# Patient Record
Sex: MALE | Race: WHITE | Employment: OTHER | URBAN - METROPOLITAN AREA
[De-identification: names, ages, dates, MRNs, and addresses within clinical notes are randomized per-mention and may not be internally consistent; named-entity substitution may affect disease eponyms.]

---

## 2019-02-11 ENCOUNTER — OFFICE VISIT (OUTPATIENT)
Dept: SURGERY | Age: 59
End: 2019-02-11
Payer: COMMERCIAL

## 2019-02-11 VITALS
WEIGHT: 266 LBS | RESPIRATION RATE: 16 BRPM | BODY MASS INDEX: 41.75 KG/M2 | DIASTOLIC BLOOD PRESSURE: 78 MMHG | HEART RATE: 78 BPM | SYSTOLIC BLOOD PRESSURE: 120 MMHG | OXYGEN SATURATION: 98 % | TEMPERATURE: 98 F | HEIGHT: 67 IN

## 2019-02-11 DIAGNOSIS — R22.31 MASS OF AXILLA, RIGHT: Primary | ICD-10-CM

## 2019-02-11 DIAGNOSIS — L91.8 SKIN TAG: ICD-10-CM

## 2019-02-11 PROCEDURE — G8484 FLU IMMUNIZE NO ADMIN: HCPCS | Performed by: SURGERY

## 2019-02-11 PROCEDURE — G8427 DOCREV CUR MEDS BY ELIG CLIN: HCPCS | Performed by: SURGERY

## 2019-02-11 PROCEDURE — 99204 OFFICE O/P NEW MOD 45 MIN: CPT | Performed by: SURGERY

## 2019-02-11 PROCEDURE — 3017F COLORECTAL CA SCREEN DOC REV: CPT | Performed by: SURGERY

## 2019-02-11 PROCEDURE — G8417 CALC BMI ABV UP PARAM F/U: HCPCS | Performed by: SURGERY

## 2019-02-13 ENCOUNTER — TELEPHONE (OUTPATIENT)
Dept: SURGERY | Age: 59
End: 2019-02-13

## 2019-02-20 RX ORDER — HYDROCODONE BITARTRATE AND ACETAMINOPHEN 7.5; 325 MG/1; MG/1
1 TABLET ORAL EVERY 6 HOURS PRN
Status: ON HOLD | COMMUNITY
End: 2022-10-26 | Stop reason: HOSPADM

## 2019-02-25 ENCOUNTER — ANESTHESIA EVENT (OUTPATIENT)
Dept: OPERATING ROOM | Age: 59
End: 2019-02-25
Payer: COMMERCIAL

## 2019-02-26 ENCOUNTER — HOSPITAL ENCOUNTER (OUTPATIENT)
Age: 59
Setting detail: OUTPATIENT SURGERY
Discharge: HOME | End: 2019-02-26
Attending: SURGERY | Admitting: SURGERY
Payer: COMMERCIAL

## 2019-02-26 ENCOUNTER — PREP FOR PROCEDURE (OUTPATIENT)
Dept: SURGERY | Age: 59
End: 2019-02-26

## 2019-02-26 ENCOUNTER — ANESTHESIA (OUTPATIENT)
Dept: OPERATING ROOM | Age: 59
End: 2019-02-26
Payer: COMMERCIAL

## 2019-02-26 VITALS — DIASTOLIC BLOOD PRESSURE: 65 MMHG | TEMPERATURE: 96.3 F | OXYGEN SATURATION: 92 % | SYSTOLIC BLOOD PRESSURE: 112 MMHG

## 2019-02-26 VITALS
BODY MASS INDEX: 41.75 KG/M2 | WEIGHT: 266 LBS | HEIGHT: 67 IN | DIASTOLIC BLOOD PRESSURE: 67 MMHG | RESPIRATION RATE: 20 BRPM | OXYGEN SATURATION: 93 % | HEART RATE: 60 BPM | TEMPERATURE: 97.2 F | SYSTOLIC BLOOD PRESSURE: 120 MMHG

## 2019-02-26 DIAGNOSIS — L91.8 SKIN TAG: ICD-10-CM

## 2019-02-26 DIAGNOSIS — R22.31 MASS OF RIGHT AXILLA: Primary | ICD-10-CM

## 2019-02-26 LAB — METER GLUCOSE: 134 MG/DL (ref 74–99)

## 2019-02-26 PROCEDURE — 2500000003 HC RX 250 WO HCPCS: Performed by: NURSE ANESTHETIST, CERTIFIED REGISTERED

## 2019-02-26 PROCEDURE — 7100000011 HC PHASE II RECOVERY - ADDTL 15 MIN: Performed by: SURGERY

## 2019-02-26 PROCEDURE — 88304 TISSUE EXAM BY PATHOLOGIST: CPT

## 2019-02-26 PROCEDURE — 3700000000 HC ANESTHESIA ATTENDED CARE: Performed by: SURGERY

## 2019-02-26 PROCEDURE — 6360000002 HC RX W HCPCS: Performed by: SURGERY

## 2019-02-26 PROCEDURE — 2500000003 HC RX 250 WO HCPCS: Performed by: SURGERY

## 2019-02-26 PROCEDURE — 7100000010 HC PHASE II RECOVERY - FIRST 15 MIN: Performed by: SURGERY

## 2019-02-26 PROCEDURE — 3600000012 HC SURGERY LEVEL 2 ADDTL 15MIN: Performed by: SURGERY

## 2019-02-26 PROCEDURE — 3700000001 HC ADD 15 MINUTES (ANESTHESIA): Performed by: SURGERY

## 2019-02-26 PROCEDURE — 2580000003 HC RX 258: Performed by: NURSE ANESTHETIST, CERTIFIED REGISTERED

## 2019-02-26 PROCEDURE — 6360000002 HC RX W HCPCS: Performed by: NURSE ANESTHETIST, CERTIFIED REGISTERED

## 2019-02-26 PROCEDURE — 2709999900 HC NON-CHARGEABLE SUPPLY: Performed by: SURGERY

## 2019-02-26 PROCEDURE — 2580000003 HC RX 258: Performed by: SURGERY

## 2019-02-26 PROCEDURE — 11402 EXC TR-EXT B9+MARG 1.1-2 CM: CPT | Performed by: SURGERY

## 2019-02-26 PROCEDURE — 6370000000 HC RX 637 (ALT 250 FOR IP): Performed by: SURGERY

## 2019-02-26 PROCEDURE — 11200 RMVL SKIN TAGS UP TO&INC 15: CPT | Performed by: SURGERY

## 2019-02-26 PROCEDURE — 3600000002 HC SURGERY LEVEL 2 BASE: Performed by: SURGERY

## 2019-02-26 PROCEDURE — 82962 GLUCOSE BLOOD TEST: CPT

## 2019-02-26 RX ORDER — PROMETHAZINE HYDROCHLORIDE 25 MG/ML
6.25 INJECTION, SOLUTION INTRAMUSCULAR; INTRAVENOUS
Status: DISCONTINUED | OUTPATIENT
Start: 2019-02-26 | End: 2019-02-26 | Stop reason: HOSPADM

## 2019-02-26 RX ORDER — SODIUM CHLORIDE 0.9 % (FLUSH) 0.9 %
10 SYRINGE (ML) INJECTION EVERY 12 HOURS SCHEDULED
Status: CANCELLED | OUTPATIENT
Start: 2019-02-26

## 2019-02-26 RX ORDER — GLYCOPYRROLATE 1 MG/5 ML
SYRINGE (ML) INTRAVENOUS PRN
Status: DISCONTINUED | OUTPATIENT
Start: 2019-02-26 | End: 2019-02-26 | Stop reason: SDUPTHER

## 2019-02-26 RX ORDER — SODIUM CHLORIDE 0.9 % (FLUSH) 0.9 %
10 SYRINGE (ML) INJECTION EVERY 12 HOURS SCHEDULED
Status: DISCONTINUED | OUTPATIENT
Start: 2019-02-26 | End: 2019-02-26 | Stop reason: HOSPADM

## 2019-02-26 RX ORDER — FENTANYL CITRATE 50 UG/ML
50 INJECTION, SOLUTION INTRAMUSCULAR; INTRAVENOUS EVERY 5 MIN PRN
Status: DISCONTINUED | OUTPATIENT
Start: 2019-02-26 | End: 2019-02-26 | Stop reason: HOSPADM

## 2019-02-26 RX ORDER — DIPHENHYDRAMINE HYDROCHLORIDE 50 MG/ML
12.5 INJECTION INTRAMUSCULAR; INTRAVENOUS
Status: DISCONTINUED | OUTPATIENT
Start: 2019-02-26 | End: 2019-02-26 | Stop reason: HOSPADM

## 2019-02-26 RX ORDER — LIDOCAINE HYDROCHLORIDE AND EPINEPHRINE 10; 10 MG/ML; UG/ML
INJECTION, SOLUTION INFILTRATION; PERINEURAL PRN
Status: DISCONTINUED | OUTPATIENT
Start: 2019-02-26 | End: 2019-02-26 | Stop reason: ALTCHOICE

## 2019-02-26 RX ORDER — SODIUM CHLORIDE 0.9 % (FLUSH) 0.9 %
10 SYRINGE (ML) INJECTION PRN
Status: DISCONTINUED | OUTPATIENT
Start: 2019-02-26 | End: 2019-02-26 | Stop reason: HOSPADM

## 2019-02-26 RX ORDER — SODIUM CHLORIDE 9 MG/ML
INJECTION, SOLUTION INTRAVENOUS CONTINUOUS
Status: DISCONTINUED | OUTPATIENT
Start: 2019-02-26 | End: 2019-02-26 | Stop reason: HOSPADM

## 2019-02-26 RX ORDER — SODIUM CHLORIDE 0.9 % (FLUSH) 0.9 %
10 SYRINGE (ML) INJECTION PRN
Status: CANCELLED | OUTPATIENT
Start: 2019-02-26

## 2019-02-26 RX ORDER — SODIUM CHLORIDE 9 MG/ML
INJECTION, SOLUTION INTRAVENOUS CONTINUOUS
Status: CANCELLED | OUTPATIENT
Start: 2019-02-26

## 2019-02-26 RX ORDER — FENTANYL CITRATE 50 UG/ML
INJECTION, SOLUTION INTRAMUSCULAR; INTRAVENOUS PRN
Status: DISCONTINUED | OUTPATIENT
Start: 2019-02-26 | End: 2019-02-26 | Stop reason: SDUPTHER

## 2019-02-26 RX ORDER — MEPERIDINE HYDROCHLORIDE 25 MG/ML
12.5 INJECTION INTRAMUSCULAR; INTRAVENOUS; SUBCUTANEOUS EVERY 5 MIN PRN
Status: DISCONTINUED | OUTPATIENT
Start: 2019-02-26 | End: 2019-02-26 | Stop reason: HOSPADM

## 2019-02-26 RX ORDER — PROPOFOL 10 MG/ML
INJECTION, EMULSION INTRAVENOUS CONTINUOUS PRN
Status: DISCONTINUED | OUTPATIENT
Start: 2019-02-26 | End: 2019-02-26 | Stop reason: SDUPTHER

## 2019-02-26 RX ORDER — SODIUM CHLORIDE 9 MG/ML
INJECTION, SOLUTION INTRAVENOUS CONTINUOUS PRN
Status: DISCONTINUED | OUTPATIENT
Start: 2019-02-26 | End: 2019-02-26 | Stop reason: SDUPTHER

## 2019-02-26 RX ORDER — MIDAZOLAM HYDROCHLORIDE 1 MG/ML
INJECTION INTRAMUSCULAR; INTRAVENOUS PRN
Status: DISCONTINUED | OUTPATIENT
Start: 2019-02-26 | End: 2019-02-26 | Stop reason: SDUPTHER

## 2019-02-26 RX ORDER — FENTANYL CITRATE 50 UG/ML
25 INJECTION, SOLUTION INTRAMUSCULAR; INTRAVENOUS EVERY 5 MIN PRN
Status: DISCONTINUED | OUTPATIENT
Start: 2019-02-26 | End: 2019-02-26 | Stop reason: HOSPADM

## 2019-02-26 RX ORDER — HYDROCODONE BITARTRATE AND ACETAMINOPHEN 5; 325 MG/1; MG/1
1 TABLET ORAL EVERY 6 HOURS PRN
Qty: 20 TABLET | Refills: 0 | Status: SHIPPED | OUTPATIENT
Start: 2019-02-26 | End: 2019-03-03

## 2019-02-26 RX ORDER — OXYCODONE HYDROCHLORIDE AND ACETAMINOPHEN 5; 325 MG/1; MG/1
1 TABLET ORAL
Status: DISCONTINUED | OUTPATIENT
Start: 2019-02-26 | End: 2019-02-26 | Stop reason: HOSPADM

## 2019-02-26 RX ORDER — DIAPER,BRIEF,INFANT-TODD,DISP
EACH MISCELLANEOUS PRN
Status: DISCONTINUED | OUTPATIENT
Start: 2019-02-26 | End: 2019-02-26 | Stop reason: ALTCHOICE

## 2019-02-26 RX ORDER — PROPOFOL 10 MG/ML
INJECTION, EMULSION INTRAVENOUS PRN
Status: DISCONTINUED | OUTPATIENT
Start: 2019-02-26 | End: 2019-02-26 | Stop reason: SDUPTHER

## 2019-02-26 RX ADMIN — FENTANYL CITRATE 25 MCG: 50 INJECTION, SOLUTION INTRAMUSCULAR; INTRAVENOUS at 07:57

## 2019-02-26 RX ADMIN — FENTANYL CITRATE 50 MCG: 50 INJECTION, SOLUTION INTRAMUSCULAR; INTRAVENOUS at 07:43

## 2019-02-26 RX ADMIN — Medication 0.2 MG: at 07:39

## 2019-02-26 RX ADMIN — PROPOFOL 50 MG: 10 INJECTION, EMULSION INTRAVENOUS at 07:43

## 2019-02-26 RX ADMIN — MIDAZOLAM HYDROCHLORIDE 2 MG: 1 INJECTION, SOLUTION INTRAMUSCULAR; INTRAVENOUS at 07:36

## 2019-02-26 RX ADMIN — SODIUM CHLORIDE: 9 INJECTION, SOLUTION INTRAVENOUS at 07:36

## 2019-02-26 RX ADMIN — CEFAZOLIN 3 G: 1 INJECTION, POWDER, FOR SOLUTION INTRAMUSCULAR; INTRAVENOUS at 07:36

## 2019-02-26 RX ADMIN — FENTANYL CITRATE 25 MCG: 50 INJECTION, SOLUTION INTRAMUSCULAR; INTRAVENOUS at 08:05

## 2019-02-26 RX ADMIN — PROPOFOL 140 MCG/KG/MIN: 10 INJECTION, EMULSION INTRAVENOUS at 07:43

## 2019-02-26 ASSESSMENT — PAIN SCALES - GENERAL
PAINLEVEL_OUTOF10: 0

## 2019-02-26 ASSESSMENT — ENCOUNTER SYMPTOMS
ABDOMINAL PAIN: 0
COUGH: 0
PHOTOPHOBIA: 0
WHEEZING: 0
SORE THROAT: 0
VOMITING: 0
BACK PAIN: 0
EYE REDNESS: 0
NAUSEA: 0
DIARRHEA: 0
SHORTNESS OF BREATH: 0
CONSTIPATION: 0
BLOOD IN STOOL: 0

## 2019-02-26 ASSESSMENT — PULMONARY FUNCTION TESTS
PIF_VALUE: 0
PIF_VALUE: 1
PIF_VALUE: 0
PIF_VALUE: 1
PIF_VALUE: 1
PIF_VALUE: 0
PIF_VALUE: 0
PIF_VALUE: 1
PIF_VALUE: 0
PIF_VALUE: 1
PIF_VALUE: 0
PIF_VALUE: 1
PIF_VALUE: 0
PIF_VALUE: 0
PIF_VALUE: 1
PIF_VALUE: 0
PIF_VALUE: 0
PIF_VALUE: 1
PIF_VALUE: 0
PIF_VALUE: 0
PIF_VALUE: 1
PIF_VALUE: 0
PIF_VALUE: 1
PIF_VALUE: 0
PIF_VALUE: 1
PIF_VALUE: 0

## 2019-02-26 ASSESSMENT — PAIN - FUNCTIONAL ASSESSMENT: PAIN_FUNCTIONAL_ASSESSMENT: 0-10

## 2019-03-07 ENCOUNTER — TELEPHONE (OUTPATIENT)
Dept: SURGERY | Age: 59
End: 2019-03-07

## 2019-03-11 ENCOUNTER — OFFICE VISIT (OUTPATIENT)
Dept: SURGERY | Age: 59
End: 2019-03-11

## 2019-03-11 VITALS
OXYGEN SATURATION: 94 % | HEIGHT: 66 IN | DIASTOLIC BLOOD PRESSURE: 86 MMHG | WEIGHT: 271 LBS | RESPIRATION RATE: 18 BRPM | BODY MASS INDEX: 43.55 KG/M2 | SYSTOLIC BLOOD PRESSURE: 141 MMHG | TEMPERATURE: 98.5 F | HEART RATE: 79 BPM

## 2019-03-11 DIAGNOSIS — L91.8 SKIN TAG: ICD-10-CM

## 2019-03-11 DIAGNOSIS — R22.31 MASS OF AXILLA, RIGHT: Primary | ICD-10-CM

## 2019-03-11 PROCEDURE — 99024 POSTOP FOLLOW-UP VISIT: CPT | Performed by: SURGERY

## 2019-06-14 ENCOUNTER — OFFICE VISIT (OUTPATIENT)
Dept: ENT CLINIC | Age: 59
End: 2019-06-14
Payer: COMMERCIAL

## 2019-06-14 ENCOUNTER — PROCEDURE VISIT (OUTPATIENT)
Dept: AUDIOLOGY | Age: 59
End: 2019-06-14
Payer: COMMERCIAL

## 2019-06-14 VITALS
HEIGHT: 67 IN | BODY MASS INDEX: 43.95 KG/M2 | SYSTOLIC BLOOD PRESSURE: 142 MMHG | DIASTOLIC BLOOD PRESSURE: 100 MMHG | OXYGEN SATURATION: 92 % | HEART RATE: 96 BPM | WEIGHT: 280 LBS

## 2019-06-14 DIAGNOSIS — H69.83 ETD (EUSTACHIAN TUBE DYSFUNCTION), BILATERAL: ICD-10-CM

## 2019-06-14 DIAGNOSIS — H61.21 IMPACTED CERUMEN OF RIGHT EAR: ICD-10-CM

## 2019-06-14 DIAGNOSIS — H92.11 OTORRHEA OF RIGHT EAR: Primary | ICD-10-CM

## 2019-06-14 DIAGNOSIS — H65.91 FLUID LEVEL BEHIND TYMPANIC MEMBRANE OF RIGHT EAR: Primary | ICD-10-CM

## 2019-06-14 DIAGNOSIS — H69.80 DYSFUNCTION OF EUSTACHIAN TUBE, UNSPECIFIED LATERALITY: ICD-10-CM

## 2019-06-14 PROCEDURE — 99204 OFFICE O/P NEW MOD 45 MIN: CPT | Performed by: OTOLARYNGOLOGY

## 2019-06-14 PROCEDURE — 3017F COLORECTAL CA SCREEN DOC REV: CPT | Performed by: OTOLARYNGOLOGY

## 2019-06-14 PROCEDURE — G8427 DOCREV CUR MEDS BY ELIG CLIN: HCPCS | Performed by: OTOLARYNGOLOGY

## 2019-06-14 PROCEDURE — 69210 REMOVE IMPACTED EAR WAX UNI: CPT | Performed by: OTOLARYNGOLOGY

## 2019-06-14 PROCEDURE — 92567 TYMPANOMETRY: CPT | Performed by: AUDIOLOGIST

## 2019-06-14 PROCEDURE — G8417 CALC BMI ABV UP PARAM F/U: HCPCS | Performed by: OTOLARYNGOLOGY

## 2019-06-14 PROCEDURE — 1036F TOBACCO NON-USER: CPT | Performed by: OTOLARYNGOLOGY

## 2019-06-14 ASSESSMENT — ENCOUNTER SYMPTOMS
RESPIRATORY NEGATIVE: 1
SHORTNESS OF BREATH: 0
EYES NEGATIVE: 1
COLOR CHANGE: 0
ABDOMINAL PAIN: 0
GASTROINTESTINAL NEGATIVE: 1

## 2019-06-14 NOTE — PROGRESS NOTES
Subjective:      Patient ID:  Sumit Riley is a 60 y.o. male. HPI:    Patient presents today with a moderate problem of the right ear. It started 3 years ago. Patient states that nothing makes it better and nothing makes it worse. 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 Review of Systems   Constitutional: Negative. HENT: Positive for congestion, ear discharge and ear pain. Eyes: Negative. Negative for visual disturbance. Respiratory: Negative. Negative for shortness of breath. Cardiovascular: Negative. Negative for chest pain. Gastrointestinal: Negative. Negative for abdominal pain. Genitourinary: Negative. Musculoskeletal: Negative. Skin: Negative. Negative for color change. Neurological: Negative. Psychiatric/Behavioral: Negative. Negative for behavioral problems and hallucinations. All other systems reviewed and are negative. Objective:   Physical Exam   Constitutional: He is oriented to person, place, and time. He appears well-developed and well-nourished. HENT:   Head: Normocephalic and atraumatic. Right Ear: There is drainage. Tympanic membrane is erythematous.  Tympanic membrane mobility is abnormal.   Left Ear: Hearing, tympanic membrane, external ear and ear canal normal.   Nose: Nose normal.   Mouth/Throat: Uvula is midline, oropharynx is clear and moist and mucous membranes are normal.   left ear clear    Right ear drainage in canal, suctioned to show irritated right TM. Boric acid placed in the right ear canal.    Eyes: Pupils are equal, round, and reactive to light. Conjunctivae and EOM are normal.   Neck: Normal range of motion. Neck supple. Cardiovascular: Normal rate, regular rhythm and normal heart sounds. Pulmonary/Chest: Effort normal and breath sounds normal.   Abdominal: Soft. Bowel sounds are normal.   Neurological: He is alert and oriented to person, place, and time. Skin: Skin is warm and dry. Nursing note and vitals reviewed. Tymp:      Cerumen removal     Auditory canal(s) right ear partially obstructed with cerumen. A microscope was used due to deep impaction of the cerumen. Cerumen was gently removed using soft plastic curette, suction. Tympanic membranes are intact following the procedure. Auditory canals appear  inflamed. Assessment:       Diagnosis Orders   1. Otorrhea of right ear  Tympanometry   2. ETD (Eustachian tube dysfunction), bilateral                Plan:      I placed boric acid in ear today I will follow up in 2 weeks, gave water precaution for the right ear.     Follow up in 2 week(s)

## 2019-07-19 ENCOUNTER — OFFICE VISIT (OUTPATIENT)
Dept: ENT CLINIC | Age: 59
End: 2019-07-19
Payer: COMMERCIAL

## 2019-07-19 VITALS
BODY MASS INDEX: 41.91 KG/M2 | HEART RATE: 56 BPM | RESPIRATION RATE: 18 BRPM | DIASTOLIC BLOOD PRESSURE: 80 MMHG | HEIGHT: 67 IN | WEIGHT: 267 LBS | SYSTOLIC BLOOD PRESSURE: 136 MMHG

## 2019-07-19 DIAGNOSIS — H61.22 IMPACTED CERUMEN OF LEFT EAR: ICD-10-CM

## 2019-07-19 DIAGNOSIS — H69.83 ETD (EUSTACHIAN TUBE DYSFUNCTION), BILATERAL: Primary | ICD-10-CM

## 2019-07-19 PROCEDURE — G8427 DOCREV CUR MEDS BY ELIG CLIN: HCPCS | Performed by: OTOLARYNGOLOGY

## 2019-07-19 PROCEDURE — 1036F TOBACCO NON-USER: CPT | Performed by: OTOLARYNGOLOGY

## 2019-07-19 PROCEDURE — 3017F COLORECTAL CA SCREEN DOC REV: CPT | Performed by: OTOLARYNGOLOGY

## 2019-07-19 PROCEDURE — 69210 REMOVE IMPACTED EAR WAX UNI: CPT | Performed by: OTOLARYNGOLOGY

## 2019-07-19 PROCEDURE — G8417 CALC BMI ABV UP PARAM F/U: HCPCS | Performed by: OTOLARYNGOLOGY

## 2019-07-19 PROCEDURE — 99213 OFFICE O/P EST LOW 20 MIN: CPT | Performed by: OTOLARYNGOLOGY

## 2019-07-19 RX ORDER — FLUTICASONE PROPIONATE 50 MCG
2 SPRAY, SUSPENSION (ML) NASAL DAILY
Qty: 1 BOTTLE | Refills: 5 | Status: SHIPPED | OUTPATIENT
Start: 2019-07-19 | End: 2019-07-19 | Stop reason: SDUPTHER

## 2019-07-19 NOTE — PROGRESS NOTES
range of motion. Lymphadenopathy:     He has no cervical adenopathy. Neurological: He is alert and oriented to person, place, and time. Skin: Skin is warm and dry. He is not diaphoretic. Cerumen removal   Auditory canal(s) left ear partially obstructed with cerumen. Cerumen was gently removed using soft plastic curette. The visible tympanic membrane is intact following the procedure. Auditory canals appear normal.       Assessment:       Diagnosis Orders   1. ETD (Eustachian tube dysfunction), bilateral     2. Impacted cerumen of left ear  CO REMOVAL IMPACTED CERUMEN INSTRUMENTATION UNILAT           Plan:       R otitis externa appears resolved   Triple antibiotic ointment for left nasal irritation   Flonase for ETD   F/U in 1 month    Electronically signed by Clarissa Moscoso DO on 7/19/19 at 11:17 AM          Pebbles Pop.  1960    I have discussed the case, including pertinent history and exam findings with the resident. I have seen and examined the patient and the key elements of the encounter have been performed by me. I agree with the assessment, plan and orders as documented by the  resident    Patient is here to establish care as a established patient in the clinic. Remainder of medical problems as per  resident note. Patient seen and examined. Agree with above exam, assessment and plan.       Electronically signed by Alfa Rios DO on 7/23/19 at 8:28 AM

## 2019-07-23 RX ORDER — FLUTICASONE PROPIONATE 50 MCG
SPRAY, SUSPENSION (ML) NASAL
Qty: 1 BOTTLE | Refills: 3 | Status: ON HOLD | OUTPATIENT
Start: 2019-07-23 | End: 2022-10-26 | Stop reason: HOSPADM

## 2019-08-23 ENCOUNTER — OFFICE VISIT (OUTPATIENT)
Dept: ENT CLINIC | Age: 59
End: 2019-08-23
Payer: COMMERCIAL

## 2019-08-23 VITALS
HEART RATE: 74 BPM | SYSTOLIC BLOOD PRESSURE: 139 MMHG | WEIGHT: 270 LBS | HEIGHT: 67 IN | BODY MASS INDEX: 42.38 KG/M2 | DIASTOLIC BLOOD PRESSURE: 82 MMHG

## 2019-08-23 DIAGNOSIS — H69.83 ETD (EUSTACHIAN TUBE DYSFUNCTION), BILATERAL: Primary | ICD-10-CM

## 2019-08-23 DIAGNOSIS — H61.23 BILATERAL IMPACTED CERUMEN: ICD-10-CM

## 2019-08-23 DIAGNOSIS — H92.11 OTORRHEA OF RIGHT EAR: ICD-10-CM

## 2019-08-23 PROCEDURE — 3017F COLORECTAL CA SCREEN DOC REV: CPT | Performed by: OTOLARYNGOLOGY

## 2019-08-23 PROCEDURE — G8427 DOCREV CUR MEDS BY ELIG CLIN: HCPCS | Performed by: OTOLARYNGOLOGY

## 2019-08-23 PROCEDURE — 99213 OFFICE O/P EST LOW 20 MIN: CPT | Performed by: OTOLARYNGOLOGY

## 2019-08-23 PROCEDURE — 1036F TOBACCO NON-USER: CPT | Performed by: OTOLARYNGOLOGY

## 2019-08-23 PROCEDURE — G8417 CALC BMI ABV UP PARAM F/U: HCPCS | Performed by: OTOLARYNGOLOGY

## 2019-08-23 PROCEDURE — 69210 REMOVE IMPACTED EAR WAX UNI: CPT | Performed by: OTOLARYNGOLOGY

## 2020-02-25 ENCOUNTER — HOSPITAL ENCOUNTER (OUTPATIENT)
Age: 60
Discharge: HOME | End: 2020-02-25
Payer: COMMERCIAL

## 2020-02-25 ENCOUNTER — OFFICE VISIT (OUTPATIENT)
Dept: ENT CLINIC | Age: 60
End: 2020-02-25
Payer: COMMERCIAL

## 2020-02-25 VITALS
HEIGHT: 67 IN | WEIGHT: 273 LBS | SYSTOLIC BLOOD PRESSURE: 136 MMHG | BODY MASS INDEX: 42.85 KG/M2 | DIASTOLIC BLOOD PRESSURE: 88 MMHG | HEART RATE: 55 BPM

## 2020-02-25 LAB
BUN BLDV-MCNC: 14 MG/DL (ref 8–23)
CREAT SERPL-MCNC: 1 MG/DL (ref 0.7–1.2)
GFR AFRICAN AMERICAN: >60
GFR NON-AFRICAN AMERICAN: >60 ML/MIN/1.73

## 2020-02-25 PROCEDURE — 84520 ASSAY OF UREA NITROGEN: CPT

## 2020-02-25 PROCEDURE — 82565 ASSAY OF CREATININE: CPT

## 2020-02-25 PROCEDURE — G8427 DOCREV CUR MEDS BY ELIG CLIN: HCPCS | Performed by: OTOLARYNGOLOGY

## 2020-02-25 PROCEDURE — G8484 FLU IMMUNIZE NO ADMIN: HCPCS | Performed by: OTOLARYNGOLOGY

## 2020-02-25 PROCEDURE — 69210 REMOVE IMPACTED EAR WAX UNI: CPT | Performed by: OTOLARYNGOLOGY

## 2020-02-25 PROCEDURE — 3017F COLORECTAL CA SCREEN DOC REV: CPT | Performed by: OTOLARYNGOLOGY

## 2020-02-25 PROCEDURE — 1036F TOBACCO NON-USER: CPT | Performed by: OTOLARYNGOLOGY

## 2020-02-25 PROCEDURE — 36415 COLL VENOUS BLD VENIPUNCTURE: CPT

## 2020-02-25 PROCEDURE — G8417 CALC BMI ABV UP PARAM F/U: HCPCS | Performed by: OTOLARYNGOLOGY

## 2020-02-25 PROCEDURE — 99213 OFFICE O/P EST LOW 20 MIN: CPT | Performed by: OTOLARYNGOLOGY

## 2020-02-25 RX ORDER — ATORVASTATIN CALCIUM 10 MG/1
10 TABLET, FILM COATED ORAL DAILY
COMMUNITY

## 2020-02-25 RX ORDER — FLUTICASONE PROPIONATE 50 MCG
2 SPRAY, SUSPENSION (ML) NASAL DAILY
Qty: 1 BOTTLE | Refills: 3 | Status: SHIPPED | OUTPATIENT
Start: 2020-02-25

## 2020-02-25 NOTE — PROGRESS NOTES
Department of Otolaryngology  Office Consult Note  7/19/19      Subjective:      Patient ID: Tea Rasmussen. is a 60 y.o. male. HPI: Patient presents as follow-up for R ear recheck for otorrhea, cerumen impaction and ETD, bilaterally. States he has some crackling sound in his ears and decreased hearing in the R ear. His R ear has also been draining     Review of Systems   Constitutional: Negative. HENT: As stated above in HPI, otherwise negative. Eyes: Negative. Respiratory: Negative. Cardiovascular: Negative. Gastrointestinal: Negative. Genitourinary: Negative. Musculoskeletal: Negative. Neurological: Negative. Patient's medications, allergies, past medical, surgical, social and family histories were reviewedand updated as appropriate. Objective:   /88 (Site: Left Upper Arm, Position: Sitting, Cuff Size: Large Adult)   Pulse 55   Ht 5' 7\" (1.702 m)   Wt 273 lb (123.8 kg)   BMI 42.76 kg/m²     Physical Exam  Constitutional:       General: He is not in acute distress. Appearance: He is not diaphoretic. HENT:      Head: Normocephalic and atraumatic. Comments: Right ear draining and not normal     Right Ear: Tympanic membrane, ear canal and external ear normal.      Left Ear: Tympanic membrane, ear canal and external ear normal.      Nose: Mucosal edema present. Mouth/Throat:      Pharynx: No oropharyngeal exudate. Eyes:      Conjunctiva/sclera: Conjunctivae normal.      Pupils: Pupils are equal, round, and reactive to light. Neck:      Musculoskeletal: Normal range of motion and neck supple. Cardiovascular:      Rate and Rhythm: Normal rate. Pulmonary:      Effort: Pulmonary effort is normal. No respiratory distress. Abdominal:      General: There is no distension. Musculoskeletal: Normal range of motion. Lymphadenopathy:      Cervical: No cervical adenopathy. Skin:     General: Skin is warm and dry.    Neurological:      Mental Status: He is alert and oriented to person, place, and time. Cerumen Impaction Removal Procedure     Auditory canal(s) right ear partially obstructed with cerumen. A microscope was used due to deep impaction of the cerumen. Cerumen was gently removed using soft plastic curette, suction. Tympanic membranes are intact following the procedure. Auditory canals appear normal.              Assessment:       Diagnosis Orders   1. Drainage from right ear     2. ETD (Eustachian tube dysfunction), bilateral     3. Otorrhea of right ear  CT IAC POSTERIOR FOSSA W WO CONTRAST   4. Impacted cerumen of left ear  MT REMOVAL IMPACTED CERUMEN IRRIGATION/LVG UNILAT           Plan:   L cerumen impaction, R otorrhea  Cerumen removed and otorrhea suctioned. Boric acid was placed in the R EAC  CT IAC for R chronic draining ear   ETD- continue Flunoase  Follow up in 1 month for recheck          Trixie Matias.  1960    I have discussed the case, including pertinent history and exam findings with the resident. I have seen and examined the patient and the key elements of the encounter have been performed by me. I agree with the assessment, plan and orders as documented by the  resident              Remainder of medical problems as per  resident note. Patient seen and examined. Agree with above exam, assessment and plan.       Electronically signed by Casie Fitch DO on 2/25/20 at 2:09 PM

## 2020-02-27 ENCOUNTER — TELEPHONE (OUTPATIENT)
Dept: ENT CLINIC | Age: 60
End: 2020-02-27

## 2020-03-09 ENCOUNTER — HOSPITAL ENCOUNTER (OUTPATIENT)
Dept: CT IMAGING | Age: 60
Discharge: HOME | End: 2020-03-11
Payer: COMMERCIAL

## 2020-03-09 PROCEDURE — 6360000004 HC RX CONTRAST MEDICATION: Performed by: RADIOLOGY

## 2020-03-09 PROCEDURE — 70482 CT ORBIT/EAR/FOSSA W/O&W/DYE: CPT

## 2020-03-09 RX ADMIN — IOPAMIDOL 80 ML: 755 INJECTION, SOLUTION INTRAVENOUS at 09:52

## 2020-03-30 ENCOUNTER — TELEPHONE (OUTPATIENT)
Dept: ENT CLINIC | Age: 60
End: 2020-03-30

## 2020-04-07 ENCOUNTER — TELEPHONE (OUTPATIENT)
Dept: ENT CLINIC | Age: 60
End: 2020-04-07

## 2020-06-24 ENCOUNTER — OFFICE VISIT (OUTPATIENT)
Dept: ENT CLINIC | Age: 60
End: 2020-06-24
Payer: COMMERCIAL

## 2020-06-24 VITALS — WEIGHT: 273 LBS | BODY MASS INDEX: 42.85 KG/M2 | TEMPERATURE: 98.7 F | HEIGHT: 67 IN

## 2020-06-24 PROCEDURE — 99213 OFFICE O/P EST LOW 20 MIN: CPT | Performed by: OTOLARYNGOLOGY

## 2020-06-24 PROCEDURE — 1036F TOBACCO NON-USER: CPT | Performed by: OTOLARYNGOLOGY

## 2020-06-24 PROCEDURE — G8427 DOCREV CUR MEDS BY ELIG CLIN: HCPCS | Performed by: OTOLARYNGOLOGY

## 2020-06-24 PROCEDURE — G8417 CALC BMI ABV UP PARAM F/U: HCPCS | Performed by: OTOLARYNGOLOGY

## 2020-06-24 PROCEDURE — 69210 REMOVE IMPACTED EAR WAX UNI: CPT | Performed by: OTOLARYNGOLOGY

## 2020-06-24 PROCEDURE — 3017F COLORECTAL CA SCREEN DOC REV: CPT | Performed by: OTOLARYNGOLOGY

## 2020-06-24 ASSESSMENT — ENCOUNTER SYMPTOMS
RESPIRATORY NEGATIVE: 1
ABDOMINAL PAIN: 0
EYES NEGATIVE: 1
GASTROINTESTINAL NEGATIVE: 1
SHORTNESS OF BREATH: 0
COLOR CHANGE: 0

## 2020-06-24 NOTE — PROGRESS NOTES
Emotionally abused: None     Physically abused: None     Forced sexual activity: None   Other Topics Concern    None   Social History Narrative    None     No Known Allergies        Review of Systems   Constitutional: Negative. HENT: Positive for congestion, ear discharge and ear pain. Eyes: Negative. Negative for visual disturbance. Respiratory: Negative. Negative for shortness of breath. Cardiovascular: Negative. Negative for chest pain. Gastrointestinal: Negative. Negative for abdominal pain. Genitourinary: Negative. Musculoskeletal: Negative. Skin: Negative. Negative for color change. Neurological: Negative. Psychiatric/Behavioral: Negative. Negative for behavioral problems and hallucinations. All other systems reviewed and are negative. Objective:     Vitals:    06/24/20 1112   Temp: 98.7 °F (37.1 °C)     Physical Exam  Constitutional:       General: He is not in acute distress. Appearance: He is not diaphoretic. HENT:      Head: Normocephalic and atraumatic. Comments: Right ear draining TM erythematous and bulging     Right Ear: Tympanic membrane, ear canal and external ear normal.      Left Ear: Tympanic membrane, ear canal and external ear normal.      Nose: Mucosal edema present. Mouth/Throat:      Pharynx: No oropharyngeal exudate. Eyes:      Conjunctiva/sclera: Conjunctivae normal.      Pupils: Pupils are equal, round, and reactive to light. Neck:      Musculoskeletal: Normal range of motion and neck supple. Cardiovascular:      Rate and Rhythm: Normal rate. Pulmonary:      Effort: Pulmonary effort is normal. No respiratory distress. Abdominal:      General: There is no distension. Musculoskeletal: Normal range of motion. Lymphadenopathy:      Cervical: No cervical adenopathy. Skin:     General: Skin is warm and dry. Neurological:      Mental Status: He is alert and oriented to person, place, and time.        Cerumen

## 2020-11-24 NOTE — TELEPHONE ENCOUNTER
Spoke with patient on March 31,2020 in regards to coming in for appointment at 10:15am 3/31/2020 however, patient was unable to come to appointment due to transportation and did not want to take the risk of covid 19. Patient's ear seems to be doing better, no pain as patient stated. Dr. Lopez looked over CT results and stated patient had no cholesteatoma or cystic lesion. Dr. Lopez stated ok to give information via phone and would like to reschedule patient.
[No studies available for review at this time.] : No studies available for review at this time.

## 2022-10-10 ENCOUNTER — HOSPITAL ENCOUNTER (OUTPATIENT)
Dept: CT IMAGING | Age: 62
Discharge: HOME | End: 2022-10-12
Payer: COMMERCIAL

## 2022-10-10 DIAGNOSIS — M16.11 ARTHRITIS OF RIGHT HIP: ICD-10-CM

## 2022-10-10 PROCEDURE — 73700 CT LOWER EXTREMITY W/O DYE: CPT

## 2022-10-18 ENCOUNTER — HOSPITAL ENCOUNTER (OUTPATIENT)
Dept: PREADMISSION TESTING | Age: 62
Discharge: HOME | End: 2022-10-18
Payer: COMMERCIAL

## 2022-10-18 VITALS
WEIGHT: 262 LBS | RESPIRATION RATE: 18 BRPM | TEMPERATURE: 98.5 F | SYSTOLIC BLOOD PRESSURE: 134 MMHG | HEART RATE: 58 BPM | BODY MASS INDEX: 41.12 KG/M2 | HEIGHT: 67 IN | OXYGEN SATURATION: 94 % | DIASTOLIC BLOOD PRESSURE: 75 MMHG

## 2022-10-18 LAB
ANION GAP SERPL CALCULATED.3IONS-SCNC: 11 MMOL/L (ref 7–16)
BUN BLDV-MCNC: 12 MG/DL (ref 6–23)
CALCIUM SERPL-MCNC: 9.3 MG/DL (ref 8.6–10.2)
CHLORIDE BLD-SCNC: 105 MMOL/L (ref 98–107)
CO2: 24 MMOL/L (ref 22–29)
CREAT SERPL-MCNC: 0.8 MG/DL (ref 0.7–1.2)
GFR SERPL CREATININE-BSD FRML MDRD: >60 ML/MIN/1.73
GLUCOSE BLD-MCNC: 109 MG/DL (ref 74–99)
HCT VFR BLD CALC: 49.3 % (ref 37–54)
HEMOGLOBIN: 16.5 G/DL (ref 12.5–16.5)
MCH RBC QN AUTO: 30.8 PG (ref 26–35)
MCHC RBC AUTO-ENTMCNC: 33.5 % (ref 32–34.5)
MCV RBC AUTO: 92.1 FL (ref 80–99.9)
PDW BLD-RTO: 12.9 FL (ref 11.5–15)
PLATELET # BLD: 277 E9/L (ref 130–450)
PMV BLD AUTO: 9.5 FL (ref 7–12)
POTASSIUM SERPL-SCNC: 3.9 MMOL/L (ref 3.5–5)
RBC # BLD: 5.35 E12/L (ref 3.8–5.8)
SODIUM BLD-SCNC: 140 MMOL/L (ref 132–146)
WBC # BLD: 10.8 E9/L (ref 4.5–11.5)

## 2022-10-18 PROCEDURE — 36415 COLL VENOUS BLD VENIPUNCTURE: CPT

## 2022-10-18 PROCEDURE — 85027 COMPLETE CBC AUTOMATED: CPT

## 2022-10-18 PROCEDURE — 87081 CULTURE SCREEN ONLY: CPT

## 2022-10-18 PROCEDURE — 80048 BASIC METABOLIC PNL TOTAL CA: CPT

## 2022-10-18 ASSESSMENT — HOOS JR
HOOS JR RAW SCORE: 16
HOOS JR TOTAL INTERVAL SCORE: 39.902
WALKING ON UNEVEN SURFACE: 3
GOING UP OR DOWN STAIRS: 3
HOOS JR RAW SCORE: 16
BENDING TO THE FLOOR TO PICK UP OBJECT: 4
RISING FROM SITTING: 1
SITTING: 3
LYING IN BED (TURNING OVER, MAINTAINING HIP POSITION): 2

## 2022-10-18 ASSESSMENT — PAIN DESCRIPTION - ORIENTATION: ORIENTATION: RIGHT

## 2022-10-18 ASSESSMENT — PAIN DESCRIPTION - LOCATION: LOCATION: HIP

## 2022-10-18 ASSESSMENT — PAIN DESCRIPTION - PAIN TYPE: TYPE: CHRONIC PAIN

## 2022-10-18 ASSESSMENT — PAIN SCALES - GENERAL: PAINLEVEL_OUTOF10: 5

## 2022-10-18 NOTE — PROGRESS NOTES
I met with Paulina Gaming this am for an orthopaedic education class. We discussed information regarding pre, intra, post-op, discharge, and LOS expectations. I provided ortho education materials. I encouraged the patient to call with any questions or concerns.

## 2022-10-18 NOTE — PROGRESS NOTES
[facility] PRE-ADMISSION TESTING INSTRUCTIONS    The Preadmission Testing patient is instructed accordingly using the following criteria (check applicable):    ARRIVAL INSTRUCTIONS:  [x] Parking the day of Surgery is located in the Main Entrance lot. Upon entering the door, make an immediate right to the surgery reception desk    [x] Bring photo ID and insurance card    [] Bring in a copy of Living will or Durable Power of  papers. [x] Please be sure to arrange for responsible adult to provide transportation to and from the hospital    [x] Please arrange for responsible adult to be with you for the 24 hour period post procedure due to having anesthesia    [x] If you awake am of surgery not feeling well or have temperature >100 please call [phone]    GENERAL INSTRUCTIONS:    [x] Nothing by mouth after midnight, including gum, candy, mints or water    [x] You may brush your teeth, but do not swallow any water    [x] Take medications as instructed with 1-2 oz of water    [] Stop herbal supplements and vitamins 5 days prior to procedure    [x] Follow preop dosing of blood thinners per physician instructions    [] Take 1/2 dose of evening insulin, but no insulin after midnight    [x] No oral diabetic medications after midnight    [x] If diabetic and have low blood sugar or feel symptomatic, take 1-2oz apple juice only    [] Bring inhalers day of surgery    [] Bring C-PAP/ Bi-Pap day of surgery    [] Bring urine specimen day of surgery    [x] Shower or bath with soap, lather and rinse well, AM of Surgery, no lotion, powders or creams to surgical site    [] Follow bowel prep as instructed per surgeon    [x] No tobacco products within 24 hours of surgery     [x] No alcohol or illegal drug use within 24 hours of surgery.     [x] Jewelry, body piercing's, eyeglasses, contact lenses and dentures are not permitted into surgery (bring cases)      [] Please do not wear any nail polish, make up or hair products on the day of surgery    [x] You can expect a call the business day prior to procedure to notify you if your arrival time changes    [x] If you receive a survey after surgery we would greatly appreciate your comments    [] Parent/guardian of a minor must accompany their child and remain on the premises  the entire time they are under our care     [] Pediatric patients may bring favorite toy, blanket or comfort item with them    [] A caregiver or family member must remain with the patient during their stay if they are mentally handicapped, have dementia, disoriented or unable to use a call light or would be a safety concern if left unattended    [x] Please notify surgeon if you develop any illness between now and time of surgery (cold, cough, sore throat, fever, nausea, vomiting) or any signs of infections  including skin, wounds, and dental.    [x]  The Outpatient Pharm[facility] is available to fill your prescription here on your day of surgery, ask your preop nurse for details    [] Other instructions    EDUCATIONAL MATERIALS PROVIDED:    [x] PAT Preoperative Education Packet/Booklet     [x] Medication List    [] Transfusion bracelet applied with instructions    [x] Shower with soap, lather and rinse well, and use CHG wipes provided the evening before surgery as instructed    [x] Incentive spirometer with instructions

## 2022-10-20 LAB — MRSA CULTURE ONLY: NORMAL

## 2022-10-25 ENCOUNTER — ANESTHESIA EVENT (OUTPATIENT)
Dept: OPERATING ROOM | Age: 62
End: 2022-10-25
Payer: COMMERCIAL

## 2022-10-25 NOTE — H&P
History and Physical  KJosh Milan MD    Chief Complaint     Paulina Gaming returns for follow up of the right hip. Pain is a 5/10. Patient states his pain is worse when walking. He does take Norco prn for pain  control. Pain: groin, thigh, lateral hip  Symptoms: instability, night pain  Previous Treatment: NSAIDS, home exercise program  Additional Comments: Paulina Gaming returns to the office for follow up right hip. He continues to have severe pain. He is scheduled for a right FRANCESCA on 10/26. Currently pain is 5/10 and is 8 at its worst.  Pain is worse with activity, putting on socks and shoes, getting in and out a car and pain improves with rest. He takes tylenol and Norco for pain.      Physical Exam   General Appearance:   Awake, alert, oriented x3  Well developed, well nourished  Obese  No acute distress  Eyes appear Normal    Respiratory:       Respiratory effort: non-labored    Cardiovascular:   Edema: absent      Varicosities: absent    Lymphatic/Skin:       Skin Appearance: Normal              Right Hip Exam   Skin Exam:    skin intact  Painful ROM:   yes  FADIR:   yes  SAHARA:   yes  Other:   Sensation intact to light touch L1-S1  TA/EHL/GS intact  Palpable DP, W/WP  Calf soft, non tender       Past Medical History - reviewed  Diabetes  Arthritis    Surgical History - reviewed    Social History - reviewed  Hand dominance: right  Occupation: retired    Risk Factors - reviewed  Patient had a flu shot in the last 12 months? no  The patient is 72 years or older and has had a pneumonia vaccine: no  Patient has an implant none  Tobacco status: former smoker  Alcohol use: does drink alcohol, drinks occasionally  Does patient exercise? no  In the past 12 months, have you fallen? no        Current Medications (including meds started today):   Farxiga 5 mg tablet (dapagliflozin)   ketoconazole 2% cream (ketoconazole)   polyethylene glycol 3350 17 gram/dose powder (polyethylene glycol 3350)   metformin 1,000 mg tablet (metformin)   hydrocodone-acetaminophen 7.5-325 mg tablet (hydrocodone-acetaminophen)   atorvastatin 10 mg tablet (atorvastatin)   etodolac 400 mg tablet extended release 24 hr (etodolac)       Current Allergies (reviewed this update):  No known allergies      Vital Signs   Weight: 259.00 lbs. (117.48 kg.)  Height: 66 in.    (167.64 cm.)  Blood Pressure: 112/69 mm Hg  Body Mass Index: 41.80  Body Surface Area: 2.23 m2      Review of Systems   General:  Patient denies sweats, weight loss, fevers, chills, fatigue. Eyes:  Patient denies eye irritation, vision loss - 1 eye, discharge, blurring, vision loss - both eyes. ENT: Positive for decreased hearing. Cardiovascular:  Patient denies chest pain or discomfort, racing / skipping heartbeats, swelling of hands or feet, difficulty breathing while lying down, palpitations, shortness of breath with exertion, weight gain, blackouts/fainting. Respiratory:  Patient denies cough, coughing up blood, chest discomfort, wheezing, shortness of breath. Gastrointestinal:  Patient denies vomiting, loss of appetite, diarrhea, nausea. Genitourinary:  Patient denies urinary retention, urinary frequency, frequent UTI, urinary urgency, pain. Musculoskeletal: Positive for muscle weakness. Skin:  Patient denies dryness, unusual hair distribution, suspicious lesions, psoriasis, changes in color of skin, changes in nail beds, poor wound healing. Neurologic: Positive for poor balance. Psychiatric:  Patient denies anxiety, depression. Heme/Lymphatic:  Patient denies abnormal bruising. Allergic/Immunologic:  Patient denies seasonal allergies, persistent infections. The remainder of the complete review of systems was negative. Impression   1. Right hip osteoarthrits: Deteriorated  2. Pain in right hip: Deteriorated    Plan of Care:   I had a long discussion regarding degenerative joint disease. Operative and nonoperative treatment was discussed.  Xrays show severe degenerative changes, nonoperative treatment was failed and pain has become a quality of life issue. Liang Thompson would like to proceed with a Right Dheeraj Total Hip Arthroplasty. The surgery, implants, postoperative course and risks and benefits of surgery were discussed and all questions were answered.

## 2022-10-25 NOTE — ANESTHESIA PRE PROCEDURE
Department of Anesthesiology  Preprocedure Note       Name:  Mat Mcgraw. Age:  60 y.o.  :  1960                                          MRN:  [id number]         Date:  10/25/2022      Surgeon: Keily Zuniga):  Maya Caban MD    Procedure: ROBOTIC FERN ASSISTED RIGHT HIP TOTAL ARTHROPLASTY    +++NATANAEL+++ (Right)    Medications prior to admission:   Prior to Admission medications    Medication Sig Start Date End Date Taking? Authorizing Provider   metFORMIN (GLUCOPHAGE) 1000 MG tablet Take 1,000 mg by mouth 2 times daily (with meals)    Historical Provider, MD   atorvastatin (LIPITOR) 10 MG tablet Take 10 mg by mouth daily    Historical Provider, MD   fluticasone (FLONASE) 50 MCG/ACT nasal spray 2 sprays by Nasal route daily Use in both nostrils. Patient not taking: Reported on 10/18/2022 2/25/20   Tevin Guerra DO   fluticasone [facility]) 50 MCG/ACT nasal spray INSTILL 2 SPRAYS IN [facility] NOSTRIL EVERY DAY  Patient not taking: Reported on 10/18/2022 7/23/19   Rebeka Lopez DO   HYDROcodone-acetaminophen (NORCO) 7.5-325 MG per tablet Take 1 tablet by mouth every 6 hours as needed. Historical Provider, MD   etodolac (LODINE XL) 400 MG SR tablet Take 400 mg by mouth daily as needed. Historical Provider, MD       Current medications:    Current Outpatient Medications   Medication Sig Dispense Refill    metFORMIN (GLUCOPHAGE) 1000 MG tablet Take 1,000 mg by mouth 2 times daily (with meals)      atorvastatin (LIPITOR) 10 MG tablet Take 10 mg by mouth daily      fluticasone (FLONASE) 50 MCG/ACT nasal spray 2 sprays by Nasal route daily Use in both nostrils. (Patient not taking: Reported on 10/18/2022) 1 Bottle 3    fluticasone (FLONASE) 50 MCG/ACT nasal spray INSTILL 2 SPRAYS IN EACH NOSTRIL EVERY DAY (Patient not taking: Reported on 10/18/2022) 1 Bottle 3    HYDROcodone-acetaminophen (NORCO) 7.5-325 MG per tablet Take 1 tablet by mouth every 6 hours as needed.       etodolac (LODINE XL) 400 MG SR tablet Take 400 mg by mouth daily as needed. No current facility-administered medications for this visit. Allergies:  No Known Allergies    Problem List:    Patient Active Problem List   Diagnosis Code    Mass of right axilla R22.31    Skin tag L91.8       Past Medical History:        Diagnosis Date    Abdominal hernia     Arthritis     Bilateral knee pain     Diabetes mellitus ([address]     Diverticulitis     Hyperlipidemia     OKSANA on CPAP     Right hip pain        Past Surgical History:        Procedure Laterality Date    ABDOMEN SURGERY  1990'S    BOWEL SURGERY TO REPAIR RUPTURED COLON, BOWEL RESECTION WITH COLOSTOMY    ABDOMEN SURGERY  1990'S    REVERSAL OF COLOSTOMY    CATARACT EXTRACTION EXTRACAPSULAR W/ INTRAOCULAR LENS IMPLANTATION Bilateral     COLONOSCOPY      HERNIA REPAIR      STOMACH    KNEE ARTHROSCOPY      LEFT    MYRINGOTOMY  03/15/2012    right ear with nasopharyngoscopy with biopsy    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                                            BP Readings from Last 3 Encounters:   10/18/22 134/75   02/25/20 136/88   08/23/19 139/82       NPO Status:  8+hrs                                                                               BMI:   Wt Readings from Last 3 Encounters:   10/18/22 262 lb (118.8 kg)   06/24/20 273 lb (123.8 kg)   02/25/20 273 lb (123.8 kg)     There is no height or weight on file to calculate BMI.    CBC:   Lab Results   Component Value Date/Time    WBC 10.8 10/18/2022 09:25 AM    RBC 5.35 10/18/2022 09:25 AM    HGB 16.5 10/18/2022 09:25 AM    HCT 49.3 10/18/2022 09:25 AM    MCV 92.1 10/18/2022 09:25 AM    RDW 12.9 10/18/2022 09:25 AM     10/18/2022 09:25 AM       CMP:   Lab Results   Component Value Date/Time     10/18/2022 09:25 AM    K 3.9 10/18/2022 09:25 AM     10/18/2022 09:25 AM    CO2 24 10/18/2022 09:25 AM    BUN 12 10/18/2022 09:25 AM    CREATININE 0.8 10/18/2022 09:25 AM    GFRAA >60 02/25/2020 11:02 AM    LABGLOM >60 10/18/2022 09:25 AM    GLUCOSE 109 10/18/2022 09:25 AM    CALCIUM 9.3 10/18/2022 09:25 AM       POC Tests: No results for input(s): POCGLU, POCNA, POCK, POCCL, POCBUN, POCHEMO, POCHCT in the last 72 hours. Coags: No results found for: PROTIME, INR, APTT    HCG (If Applicable): No results found for: PREGTESTUR, PREGSERUM, HCG, HCGQUANT     ABGs: No results found for: PHART, PO2ART, OOV9GRY, MBN7IJK, BEART, Q6CSHGAE     Type & Screen (If Applicable):  No results found for: LABABO, [address]    Anesthesia Evaluation  Patient summary reviewed no history of anesthetic complications:   Airway: Mallampati: II  TM distance: <3 FB   Neck ROM: full  Mouth opening: > = 3 FB   Dental:          Pulmonary: breath sounds clear to auscultation  (+) sleep apnea: on CPAP,                             Cardiovascular:Negative CV ROS  Exercise tolerance: good (>4 METS),       (-) past MI, CAD and CABG/stent      Rhythm: regular  Rate: normal           Beta Blocker:  Not on Beta Blocker         Neuro/Psych:   Negative Neuro/Psych ROS              GI/Hepatic/Renal:   (+) morbid obesity          Endo/Other:    (+) DiabetesType II DM, , : arthritis: OA., .                 Abdominal:   (+) obese,           Vascular: negative vascular ROS. Other Findings:             Anesthesia Plan      spinal     ASA 3     (Pt agrees to GA as backup)  Induction: intravenous. MIPS: Postoperative opioids intended and Prophylactic antiemetics administered. Anesthetic plan and risks discussed with patient. Use of blood products discussed with patient whom consented to blood products.    Plan discussed with attending. Attending anesthesiologist reviewed and agrees with Preprocedure content                Sharon Aguilera RN   10/25/2022      DOS STAFF ADDENDUM:    Pt seen and examined, physical exam updated, chart reviewed including anesthesia, drug and allergy history. H&P reviewed. No interval changes to history or physical examination (unless noted above). NPO status confirmed. Anesthetic plan, risks, benefits, alternatives discussed with patient. Patient verbalized an understanding and agrees to proceed.      Yessenia Squires MD   Anesthesiologist

## 2022-10-26 ENCOUNTER — ANESTHESIA (OUTPATIENT)
Dept: OPERATING ROOM | Age: 62
End: 2022-10-26
Payer: COMMERCIAL

## 2022-10-26 ENCOUNTER — HOSPITAL ENCOUNTER (OUTPATIENT)
Age: 62
Setting detail: OBSERVATION
Discharge: HOME HEALTH | End: 2022-10-27
Attending: ORTHOPAEDIC SURGERY | Admitting: ORTHOPAEDIC SURGERY
Payer: COMMERCIAL

## 2022-10-26 ENCOUNTER — APPOINTMENT (OUTPATIENT)
Dept: GENERAL RADIOLOGY | Age: 62
End: 2022-10-26
Attending: ORTHOPAEDIC SURGERY
Payer: COMMERCIAL

## 2022-10-26 DIAGNOSIS — M16.11 PRIMARY OSTEOARTHRITIS OF RIGHT HIP: Primary | ICD-10-CM

## 2022-10-26 DIAGNOSIS — M19.90 OSTEOARTHRITIS, UNSPECIFIED OSTEOARTHRITIS TYPE, UNSPECIFIED SITE: ICD-10-CM

## 2022-10-26 PROBLEM — E11.9 DM TYPE 2 (DIABETES MELLITUS, TYPE 2) (HCC): Status: ACTIVE | Noted: 2022-10-26

## 2022-10-26 LAB
METER GLUCOSE: 139 MG/DL (ref 74–99)
METER GLUCOSE: 154 MG/DL (ref 74–99)
METER GLUCOSE: 174 MG/DL (ref 74–99)
METER GLUCOSE: 179 MG/DL (ref 74–99)
METER GLUCOSE: 206 MG/DL (ref 74–99)

## 2022-10-26 PROCEDURE — 2500000003 HC RX 250 WO HCPCS: Performed by: ORTHOPAEDIC SURGERY

## 2022-10-26 PROCEDURE — 6360000002 HC RX W HCPCS: Performed by: NURSE PRACTITIONER

## 2022-10-26 PROCEDURE — 97165 OT EVAL LOW COMPLEX 30 MIN: CPT

## 2022-10-26 PROCEDURE — 97530 THERAPEUTIC ACTIVITIES: CPT

## 2022-10-26 PROCEDURE — 6360000002 HC RX W HCPCS: Performed by: ANESTHESIOLOGY

## 2022-10-26 PROCEDURE — 3600000005 HC SURGERY LEVEL 5 BASE: Performed by: ORTHOPAEDIC SURGERY

## 2022-10-26 PROCEDURE — G0378 HOSPITAL OBSERVATION PER HR: HCPCS

## 2022-10-26 PROCEDURE — 2580000003 HC RX 258: Performed by: NURSE PRACTITIONER

## 2022-10-26 PROCEDURE — 6360000002 HC RX W HCPCS

## 2022-10-26 PROCEDURE — 2700000000 HC OXYGEN THERAPY PER DAY

## 2022-10-26 PROCEDURE — 2500000003 HC RX 250 WO HCPCS

## 2022-10-26 PROCEDURE — 3600000015 HC SURGERY LEVEL 5 ADDTL 15MIN: Performed by: ORTHOPAEDIC SURGERY

## 2022-10-26 PROCEDURE — 2580000003 HC RX 258: Performed by: ORTHOPAEDIC SURGERY

## 2022-10-26 PROCEDURE — 73502 X-RAY EXAM HIP UNI 2-3 VIEWS: CPT

## 2022-10-26 PROCEDURE — 6370000000 HC RX 637 (ALT 250 FOR IP): Performed by: ORTHOPAEDIC SURGERY

## 2022-10-26 PROCEDURE — 6360000002 HC RX W HCPCS: Performed by: ORTHOPAEDIC SURGERY

## 2022-10-26 PROCEDURE — 82962 GLUCOSE BLOOD TEST: CPT

## 2022-10-26 PROCEDURE — 3700000001 HC ADD 15 MINUTES (ANESTHESIA): Performed by: ORTHOPAEDIC SURGERY

## 2022-10-26 PROCEDURE — 97161 PT EVAL LOW COMPLEX 20 MIN: CPT

## 2022-10-26 PROCEDURE — 2500000003 HC RX 250 WO HCPCS: Performed by: NURSE ANESTHETIST, CERTIFIED REGISTERED

## 2022-10-26 PROCEDURE — 6370000000 HC RX 637 (ALT 250 FOR IP): Performed by: NURSE PRACTITIONER

## 2022-10-26 PROCEDURE — 3700000000 HC ANESTHESIA ATTENDED CARE: Performed by: ORTHOPAEDIC SURGERY

## 2022-10-26 PROCEDURE — C1776 JOINT DEVICE (IMPLANTABLE): HCPCS | Performed by: ORTHOPAEDIC SURGERY

## 2022-10-26 PROCEDURE — 88304 TISSUE EXAM BY PATHOLOGIST: CPT

## 2022-10-26 PROCEDURE — 7100000000 HC PACU RECOVERY - FIRST 15 MIN: Performed by: ORTHOPAEDIC SURGERY

## 2022-10-26 PROCEDURE — 2720000001 HC MISC SURG SUPPLY STERILE $51-500: Performed by: ORTHOPAEDIC SURGERY

## 2022-10-26 PROCEDURE — 2500000003 HC RX 250 WO HCPCS: Performed by: NURSE PRACTITIONER

## 2022-10-26 PROCEDURE — A4216 STERILE WATER/SALINE, 10 ML: HCPCS | Performed by: ORTHOPAEDIC SURGERY

## 2022-10-26 PROCEDURE — 2720000010 HC SURG SUPPLY STERILE: Performed by: ORTHOPAEDIC SURGERY

## 2022-10-26 PROCEDURE — 2709999900 HC NON-CHARGEABLE SUPPLY: Performed by: ORTHOPAEDIC SURGERY

## 2022-10-26 PROCEDURE — 7100000001 HC PACU RECOVERY - ADDTL 15 MIN: Performed by: ORTHOPAEDIC SURGERY

## 2022-10-26 PROCEDURE — 6360000002 HC RX W HCPCS: Performed by: NURSE ANESTHETIST, CERTIFIED REGISTERED

## 2022-10-26 PROCEDURE — 88311 DECALCIFY TISSUE: CPT

## 2022-10-26 DEVICE — TRIDENT X3 0 DEGREE POLYETHYLENE INSERT
Type: IMPLANTABLE DEVICE | Site: HIP | Status: FUNCTIONAL
Brand: TRIDENT X3 INSERT

## 2022-10-26 DEVICE — 127 DEGREE NECK ANGLE HIP STEM
Type: IMPLANTABLE DEVICE | Site: HIP | Status: FUNCTIONAL
Brand: ACCOLADE

## 2022-10-26 DEVICE — TRIDENT II TRITANIUM CLUSTER 54E
Type: IMPLANTABLE DEVICE | Site: HIP | Status: FUNCTIONAL
Brand: TRIDENT II

## 2022-10-26 DEVICE — CERAMIC V40 FEMORAL HEAD
Type: IMPLANTABLE DEVICE | Site: HIP | Status: FUNCTIONAL
Brand: BIOLOX

## 2022-10-26 RX ORDER — ACETAMINOPHEN 325 MG/1
650 TABLET ORAL EVERY 6 HOURS
Status: DISCONTINUED | OUTPATIENT
Start: 2022-10-26 | End: 2022-10-27 | Stop reason: HOSPADM

## 2022-10-26 RX ORDER — SODIUM CHLORIDE 0.9 % (FLUSH) 0.9 %
5-40 SYRINGE (ML) INJECTION EVERY 12 HOURS SCHEDULED
Status: DISCONTINUED | OUTPATIENT
Start: 2022-10-26 | End: 2022-10-26 | Stop reason: HOSPADM

## 2022-10-26 RX ORDER — PROCHLORPERAZINE EDISYLATE 5 MG/ML
5 INJECTION INTRAMUSCULAR; INTRAVENOUS
Status: DISCONTINUED | OUTPATIENT
Start: 2022-10-26 | End: 2022-10-26 | Stop reason: HOSPADM

## 2022-10-26 RX ORDER — LIDOCAINE HYDROCHLORIDE 20 MG/ML
INJECTION, SOLUTION EPIDURAL; INFILTRATION; INTRACAUDAL; PERINEURAL PRN
Status: DISCONTINUED | OUTPATIENT
Start: 2022-10-26 | End: 2022-10-26 | Stop reason: SDUPTHER

## 2022-10-26 RX ORDER — SODIUM CHLORIDE 9 MG/ML
INJECTION, SOLUTION INTRAVENOUS CONTINUOUS
Status: ACTIVE | OUTPATIENT
Start: 2022-10-26 | End: 2022-10-26

## 2022-10-26 RX ORDER — PROPOFOL 10 MG/ML
INJECTION, EMULSION INTRAVENOUS PRN
Status: DISCONTINUED | OUTPATIENT
Start: 2022-10-26 | End: 2022-10-26 | Stop reason: SDUPTHER

## 2022-10-26 RX ORDER — VANCOMYCIN HYDROCHLORIDE 1 G/20ML
INJECTION, POWDER, LYOPHILIZED, FOR SOLUTION INTRAVENOUS PRN
Status: DISCONTINUED | OUTPATIENT
Start: 2022-10-26 | End: 2022-10-26 | Stop reason: ALTCHOICE

## 2022-10-26 RX ORDER — DEXAMETHASONE SODIUM PHOSPHATE 10 MG/ML
8 INJECTION, SOLUTION INTRAMUSCULAR; INTRAVENOUS ONCE
Status: DISCONTINUED | OUTPATIENT
Start: 2022-10-26 | End: 2022-10-26 | Stop reason: HOSPADM

## 2022-10-26 RX ORDER — DEXAMETHASONE SODIUM PHOSPHATE 4 MG/ML
INJECTION, SOLUTION INTRA-ARTICULAR; INTRALESIONAL; INTRAMUSCULAR; INTRAVENOUS; SOFT TISSUE PRN
Status: DISCONTINUED | OUTPATIENT
Start: 2022-10-26 | End: 2022-10-26 | Stop reason: SDUPTHER

## 2022-10-26 RX ORDER — SODIUM CHLORIDE 0.9 % (FLUSH) 0.9 %
5-40 SYRINGE (ML) INJECTION EVERY 12 HOURS SCHEDULED
Status: DISCONTINUED | OUTPATIENT
Start: 2022-10-26 | End: 2022-10-27 | Stop reason: HOSPADM

## 2022-10-26 RX ORDER — DEXAMETHASONE SODIUM PHOSPHATE 10 MG/ML
10 INJECTION INTRAMUSCULAR; INTRAVENOUS ONCE
Status: COMPLETED | OUTPATIENT
Start: 2022-10-27 | End: 2022-10-27

## 2022-10-26 RX ORDER — SODIUM CHLORIDE 0.9 % (FLUSH) 0.9 %
5-40 SYRINGE (ML) INJECTION PRN
Status: DISCONTINUED | OUTPATIENT
Start: 2022-10-26 | End: 2022-10-26 | Stop reason: HOSPADM

## 2022-10-26 RX ORDER — ACETAMINOPHEN 325 MG/1
650 TABLET ORAL EVERY 6 HOURS
Qty: 120 TABLET | Refills: 3 | Status: SHIPPED | OUTPATIENT
Start: 2022-10-26 | End: 2022-11-10 | Stop reason: ALTCHOICE

## 2022-10-26 RX ORDER — WATER 1000 ML/1000ML
INJECTION, SOLUTION INTRAVENOUS
Status: DISCONTINUED
Start: 2022-10-26 | End: 2022-10-26 | Stop reason: WASHOUT

## 2022-10-26 RX ORDER — ONDANSETRON 4 MG/1
4 TABLET, ORALLY DISINTEGRATING ORAL EVERY 8 HOURS PRN
Status: DISCONTINUED | OUTPATIENT
Start: 2022-10-26 | End: 2022-10-27 | Stop reason: HOSPADM

## 2022-10-26 RX ORDER — CEFAZOLIN 2 G/1
INJECTION, POWDER, FOR SOLUTION INTRAMUSCULAR; INTRAVENOUS
Status: DISCONTINUED
Start: 2022-10-26 | End: 2022-10-26 | Stop reason: WASHOUT

## 2022-10-26 RX ORDER — NEOSTIGMINE METHYLSULFATE 1 MG/ML
INJECTION, SOLUTION INTRAVENOUS PRN
Status: DISCONTINUED | OUTPATIENT
Start: 2022-10-26 | End: 2022-10-26 | Stop reason: SDUPTHER

## 2022-10-26 RX ORDER — SODIUM CHLORIDE 9 MG/ML
INJECTION, SOLUTION INTRAVENOUS PRN
Status: DISCONTINUED | OUTPATIENT
Start: 2022-10-26 | End: 2022-10-26 | Stop reason: HOSPADM

## 2022-10-26 RX ORDER — ONDANSETRON 2 MG/ML
4 INJECTION INTRAMUSCULAR; INTRAVENOUS EVERY 6 HOURS PRN
Status: DISCONTINUED | OUTPATIENT
Start: 2022-10-26 | End: 2022-10-27 | Stop reason: HOSPADM

## 2022-10-26 RX ORDER — SODIUM CHLORIDE 0.9 % (FLUSH) 0.9 %
5-40 SYRINGE (ML) INJECTION PRN
Status: DISCONTINUED | OUTPATIENT
Start: 2022-10-26 | End: 2022-10-27 | Stop reason: HOSPADM

## 2022-10-26 RX ORDER — CELECOXIB 100 MG/1
200 CAPSULE ORAL ONCE
Status: COMPLETED | OUTPATIENT
Start: 2022-10-26 | End: 2022-10-26

## 2022-10-26 RX ORDER — OXYCODONE HYDROCHLORIDE 15 MG/1
15 TABLET ORAL EVERY 4 HOURS PRN
Status: DISCONTINUED | OUTPATIENT
Start: 2022-10-26 | End: 2022-10-27 | Stop reason: HOSPADM

## 2022-10-26 RX ORDER — OXYCODONE HYDROCHLORIDE 5 MG/1
10 TABLET ORAL EVERY 4 HOURS PRN
Status: DISCONTINUED | OUTPATIENT
Start: 2022-10-26 | End: 2022-10-27 | Stop reason: HOSPADM

## 2022-10-26 RX ORDER — PREGABALIN 75 MG/1
75 CAPSULE ORAL ONCE
Status: COMPLETED | OUTPATIENT
Start: 2022-10-26 | End: 2022-10-26

## 2022-10-26 RX ORDER — SENNA AND DOCUSATE SODIUM 50; 8.6 MG/1; MG/1
1 TABLET, FILM COATED ORAL 2 TIMES DAILY
Qty: 30 TABLET | Refills: 1 | Status: SHIPPED | OUTPATIENT
Start: 2022-10-26 | End: 2022-11-10

## 2022-10-26 RX ORDER — ONDANSETRON 2 MG/ML
INJECTION INTRAMUSCULAR; INTRAVENOUS PRN
Status: DISCONTINUED | OUTPATIENT
Start: 2022-10-26 | End: 2022-10-26 | Stop reason: SDUPTHER

## 2022-10-26 RX ORDER — MIDAZOLAM HYDROCHLORIDE 1 MG/ML
INJECTION INTRAMUSCULAR; INTRAVENOUS PRN
Status: DISCONTINUED | OUTPATIENT
Start: 2022-10-26 | End: 2022-10-26 | Stop reason: SDUPTHER

## 2022-10-26 RX ORDER — KETOROLAC TROMETHAMINE 30 MG/ML
30 INJECTION, SOLUTION INTRAMUSCULAR; INTRAVENOUS EVERY 6 HOURS
Status: DISCONTINUED | OUTPATIENT
Start: 2022-10-26 | End: 2022-10-26

## 2022-10-26 RX ORDER — ATORVASTATIN CALCIUM 10 MG/1
10 TABLET, FILM COATED ORAL DAILY
Status: DISCONTINUED | OUTPATIENT
Start: 2022-10-26 | End: 2022-10-27 | Stop reason: HOSPADM

## 2022-10-26 RX ORDER — SENNA AND DOCUSATE SODIUM 50; 8.6 MG/1; MG/1
1 TABLET, FILM COATED ORAL 2 TIMES DAILY
Status: DISCONTINUED | OUTPATIENT
Start: 2022-10-26 | End: 2022-10-27 | Stop reason: HOSPADM

## 2022-10-26 RX ORDER — GLYCOPYRROLATE 0.2 MG/ML
INJECTION INTRAMUSCULAR; INTRAVENOUS PRN
Status: DISCONTINUED | OUTPATIENT
Start: 2022-10-26 | End: 2022-10-26 | Stop reason: SDUPTHER

## 2022-10-26 RX ORDER — KETOROLAC TROMETHAMINE 30 MG/ML
15 INJECTION, SOLUTION INTRAMUSCULAR; INTRAVENOUS EVERY 6 HOURS
Status: DISCONTINUED | OUTPATIENT
Start: 2022-10-26 | End: 2022-10-27 | Stop reason: HOSPADM

## 2022-10-26 RX ORDER — ROCURONIUM BROMIDE 10 MG/ML
INJECTION, SOLUTION INTRAVENOUS PRN
Status: DISCONTINUED | OUTPATIENT
Start: 2022-10-26 | End: 2022-10-26 | Stop reason: SDUPTHER

## 2022-10-26 RX ORDER — INSULIN LISPRO 100 [IU]/ML
0-4 INJECTION, SOLUTION INTRAVENOUS; SUBCUTANEOUS NIGHTLY
Status: DISCONTINUED | OUTPATIENT
Start: 2022-10-26 | End: 2022-10-27 | Stop reason: HOSPADM

## 2022-10-26 RX ORDER — DEXTROSE MONOHYDRATE 100 MG/ML
INJECTION, SOLUTION INTRAVENOUS CONTINUOUS PRN
Status: DISCONTINUED | OUTPATIENT
Start: 2022-10-26 | End: 2022-10-27 | Stop reason: HOSPADM

## 2022-10-26 RX ORDER — FLUTICASONE PROPIONATE 50 MCG
2 SPRAY, SUSPENSION (ML) NASAL DAILY
Status: DISCONTINUED | OUTPATIENT
Start: 2022-10-26 | End: 2022-10-27 | Stop reason: HOSPADM

## 2022-10-26 RX ORDER — INSULIN LISPRO 100 [IU]/ML
0-8 INJECTION, SOLUTION INTRAVENOUS; SUBCUTANEOUS
Status: DISCONTINUED | OUTPATIENT
Start: 2022-10-26 | End: 2022-10-27 | Stop reason: HOSPADM

## 2022-10-26 RX ORDER — SODIUM CHLORIDE 9 MG/ML
INJECTION, SOLUTION INTRAVENOUS CONTINUOUS
Status: DISCONTINUED | OUTPATIENT
Start: 2022-10-26 | End: 2022-10-26

## 2022-10-26 RX ORDER — SODIUM CHLORIDE 9 MG/ML
INJECTION, SOLUTION INTRAVENOUS PRN
Status: DISCONTINUED | OUTPATIENT
Start: 2022-10-26 | End: 2022-10-27 | Stop reason: HOSPADM

## 2022-10-26 RX ORDER — FENTANYL CITRATE 50 UG/ML
INJECTION, SOLUTION INTRAMUSCULAR; INTRAVENOUS PRN
Status: DISCONTINUED | OUTPATIENT
Start: 2022-10-26 | End: 2022-10-26 | Stop reason: SDUPTHER

## 2022-10-26 RX ORDER — OXYCODONE HYDROCHLORIDE 10 MG/1
10 TABLET ORAL EVERY 4 HOURS PRN
Qty: 42 TABLET | Refills: 0 | Status: SHIPPED | OUTPATIENT
Start: 2022-10-26 | End: 2022-11-02

## 2022-10-26 RX ORDER — ACETAMINOPHEN 500 MG
1000 TABLET ORAL ONCE
Status: COMPLETED | OUTPATIENT
Start: 2022-10-26 | End: 2022-10-26

## 2022-10-26 RX ADMIN — FENTANYL CITRATE 50 MCG: 50 INJECTION, SOLUTION INTRAMUSCULAR; INTRAVENOUS at 08:55

## 2022-10-26 RX ADMIN — ATORVASTATIN CALCIUM 10 MG: 10 TABLET, FILM COATED ORAL at 14:05

## 2022-10-26 RX ADMIN — PREGABALIN 75 MG: 75 CAPSULE ORAL at 06:13

## 2022-10-26 RX ADMIN — HYDROMORPHONE HYDROCHLORIDE 0.5 MG: 0.5 INJECTION, SOLUTION INTRAMUSCULAR; INTRAVENOUS; SUBCUTANEOUS at 09:45

## 2022-10-26 RX ADMIN — ACETAMINOPHEN 650 MG: 325 TABLET ORAL at 20:45

## 2022-10-26 RX ADMIN — CELECOXIB 200 MG: 100 CAPSULE ORAL at 06:12

## 2022-10-26 RX ADMIN — GLYCOPYRROLATE 0.6 MG: 0.2 INJECTION, SOLUTION INTRAMUSCULAR; INTRAVENOUS at 08:49

## 2022-10-26 RX ADMIN — SODIUM CHLORIDE, PRESERVATIVE FREE 10 ML: 5 INJECTION INTRAVENOUS at 21:00

## 2022-10-26 RX ADMIN — METFORMIN HYDROCHLORIDE 1000 MG: 1000 TABLET ORAL at 14:05

## 2022-10-26 RX ADMIN — FENTANYL CITRATE 50 MCG: 50 INJECTION, SOLUTION INTRAMUSCULAR; INTRAVENOUS at 09:18

## 2022-10-26 RX ADMIN — FENTANYL CITRATE 50 MCG: 50 INJECTION, SOLUTION INTRAMUSCULAR; INTRAVENOUS at 09:02

## 2022-10-26 RX ADMIN — FENTANYL CITRATE 50 MCG: 50 INJECTION, SOLUTION INTRAMUSCULAR; INTRAVENOUS at 08:00

## 2022-10-26 RX ADMIN — MIDAZOLAM 2 MG: 1 INJECTION INTRAMUSCULAR; INTRAVENOUS at 07:42

## 2022-10-26 RX ADMIN — ROCURONIUM BROMIDE 10 MG: 10 INJECTION, SOLUTION INTRAVENOUS at 08:23

## 2022-10-26 RX ADMIN — KETOROLAC TROMETHAMINE 15 MG: 30 INJECTION, SOLUTION INTRAMUSCULAR at 20:45

## 2022-10-26 RX ADMIN — ASPIRIN 325 MG: 325 TABLET, COATED ORAL at 22:23

## 2022-10-26 RX ADMIN — TRANEXAMIC ACID 1000 MG: 1 INJECTION, SOLUTION INTRAVENOUS at 10:33

## 2022-10-26 RX ADMIN — ASPIRIN 325 MG: 325 TABLET, COATED ORAL at 14:05

## 2022-10-26 RX ADMIN — KETOROLAC TROMETHAMINE 15 MG: 30 INJECTION, SOLUTION INTRAMUSCULAR at 14:17

## 2022-10-26 RX ADMIN — ROCURONIUM BROMIDE 50 MG: 10 INJECTION, SOLUTION INTRAVENOUS at 07:42

## 2022-10-26 RX ADMIN — SODIUM CHLORIDE, PRESERVATIVE FREE 10 ML: 5 INJECTION INTRAVENOUS at 14:18

## 2022-10-26 RX ADMIN — ACETAMINOPHEN 1000 MG: 500 TABLET ORAL at 06:12

## 2022-10-26 RX ADMIN — CEFAZOLIN 3000 MG: 10 INJECTION, POWDER, FOR SOLUTION INTRAVENOUS at 23:51

## 2022-10-26 RX ADMIN — ACETAMINOPHEN 650 MG: 325 TABLET ORAL at 14:05

## 2022-10-26 RX ADMIN — FENTANYL CITRATE 50 MCG: 50 INJECTION, SOLUTION INTRAMUSCULAR; INTRAVENOUS at 08:57

## 2022-10-26 RX ADMIN — TRANEXAMIC ACID 1000 MG: 1 INJECTION, SOLUTION INTRAVENOUS at 07:48

## 2022-10-26 RX ADMIN — Medication 3 MG: at 08:49

## 2022-10-26 RX ADMIN — ONDANSETRON 4 MG: 2 INJECTION INTRAMUSCULAR; INTRAVENOUS at 08:32

## 2022-10-26 RX ADMIN — CEFAZOLIN 3000 MG: 10 INJECTION, POWDER, FOR SOLUTION INTRAVENOUS at 07:18

## 2022-10-26 RX ADMIN — FENTANYL CITRATE 50 MCG: 50 INJECTION, SOLUTION INTRAMUSCULAR; INTRAVENOUS at 07:42

## 2022-10-26 RX ADMIN — DOCUSATE SODIUM 50 MG AND SENNOSIDES 8.6 MG 1 TABLET: 8.6; 5 TABLET, FILM COATED ORAL at 14:05

## 2022-10-26 RX ADMIN — METFORMIN HYDROCHLORIDE 1000 MG: 1000 TABLET ORAL at 18:19

## 2022-10-26 RX ADMIN — CEFAZOLIN 3000 MG: 10 INJECTION, POWDER, FOR SOLUTION INTRAVENOUS at 16:00

## 2022-10-26 RX ADMIN — DOCUSATE SODIUM 50 MG AND SENNOSIDES 8.6 MG 1 TABLET: 8.6; 5 TABLET, FILM COATED ORAL at 22:23

## 2022-10-26 RX ADMIN — HYDROMORPHONE HYDROCHLORIDE 0.5 MG: 0.5 INJECTION, SOLUTION INTRAMUSCULAR; INTRAVENOUS; SUBCUTANEOUS at 09:50

## 2022-10-26 RX ADMIN — FLUTICASONE PROPIONATE 2 SPRAY: 50 SPRAY, METERED NASAL at 14:05

## 2022-10-26 RX ADMIN — PROPOFOL 150 MG: 10 INJECTION, EMULSION INTRAVENOUS at 07:42

## 2022-10-26 RX ADMIN — SODIUM CHLORIDE: 9 INJECTION, SOLUTION INTRAVENOUS at 11:20

## 2022-10-26 RX ADMIN — LIDOCAINE HYDROCHLORIDE 80 MG: 20 INJECTION, SOLUTION EPIDURAL; INFILTRATION; INTRACAUDAL; PERINEURAL at 07:42

## 2022-10-26 RX ADMIN — DEXAMETHASONE SODIUM PHOSPHATE 8 MG: 4 INJECTION, SOLUTION INTRAMUSCULAR; INTRAVENOUS at 07:48

## 2022-10-26 RX ADMIN — SODIUM CHLORIDE: 9 INJECTION, SOLUTION INTRAVENOUS at 07:03

## 2022-10-26 ASSESSMENT — PAIN DESCRIPTION - FREQUENCY
FREQUENCY: CONTINUOUS

## 2022-10-26 ASSESSMENT — PAIN DESCRIPTION - DESCRIPTORS
DESCRIPTORS: POUNDING;DISCOMFORT
DESCRIPTORS: DISCOMFORT
DESCRIPTORS: DISCOMFORT;SORE;TENDER
DESCRIPTORS: DISCOMFORT;POUNDING
DESCRIPTORS: DISCOMFORT;DULL
DESCRIPTORS: DULL;DISCOMFORT
DESCRIPTORS: POUNDING;DISCOMFORT
DESCRIPTORS: DISCOMFORT;ACHING

## 2022-10-26 ASSESSMENT — PAIN DESCRIPTION - LOCATION
LOCATION: HIP

## 2022-10-26 ASSESSMENT — PAIN SCALES - GENERAL
PAINLEVEL_OUTOF10: 3
PAINLEVEL_OUTOF10: 4
PAINLEVEL_OUTOF10: 5
PAINLEVEL_OUTOF10: 5
PAINLEVEL_OUTOF10: 4
PAINLEVEL_OUTOF10: 5
PAINLEVEL_OUTOF10: 8
PAINLEVEL_OUTOF10: 4
PAINLEVEL_OUTOF10: 5
PAINLEVEL_OUTOF10: 8
PAINLEVEL_OUTOF10: 8

## 2022-10-26 ASSESSMENT — PAIN DESCRIPTION - ONSET: ONSET: ON-GOING

## 2022-10-26 ASSESSMENT — PAIN SCALES - WONG BAKER
WONGBAKER_NUMERICALRESPONSE: 0

## 2022-10-26 ASSESSMENT — PAIN DESCRIPTION - ORIENTATION
ORIENTATION: RIGHT

## 2022-10-26 ASSESSMENT — PAIN - FUNCTIONAL ASSESSMENT
PAIN_FUNCTIONAL_ASSESSMENT: ACTIVITIES ARE NOT PREVENTED
PAIN_FUNCTIONAL_ASSESSMENT: ACTIVITIES ARE NOT PREVENTED
PAIN_FUNCTIONAL_ASSESSMENT: 0-10

## 2022-10-26 ASSESSMENT — PAIN DESCRIPTION - PAIN TYPE
TYPE: ACUTE PAIN;SURGICAL PAIN

## 2022-10-26 NOTE — DISCHARGE INSTRUCTIONS
Orthopaedic Patient Instructions:     Medications for pain:    Acetaminophen - Take one tablet every 6 hours for 1 week. Then take every 6 hours as needed for pain. Tramadol - Take one tablet every 6 hours for 1 week. Then take every 6 hours as needed for pain. Oxycodone 1 tablet every 4 hours as needed for pain. You can take 2 every 6 hours for severe pain. Medication for DVT prophylaxis (Prevention of blood clots)  Aspirin - Take 1 tablet daily for 6 weeks for prevention of blood clots    Medication for constipation:  Colace - Take 1 tablet every 12 hours as needed for constipation        Activity: weight bear as tolerated, posterior hip precautions  Diet: regular diet  Wound Care: Patient should remove dressing in 9 days. Patient can shower with the dressing on but should not bathe. After removing, the dressing keep incision clean and dry    Follow-up with Dr. Jesus Ruelas in 2 weeks.   Call for an appointment

## 2022-10-26 NOTE — ANESTHESIA POSTPROCEDURE EVALUATION
Department of Anesthesiology  Postprocedure Note    Patient: Pavan Ferris MRN: [id number]  YOB: 1960  Date of evaluation: 10/26/2022      Procedure Summary     Date: 10/26/22 Room / Location: [place] [address] / [address] [place] [address]    Anesthesia Start: 0703 Anesthesia Stop: 6846    Procedure: ROBOTIC FERN ASSISTED RIGHT HIP TOTAL ARTHROPLASTY    +++NATANAEL+++ (Right: Hip) Diagnosis:       Osteoarthritis, unspecified osteoarthritis type, unspecified site      (Osteoarthritis, unspecified osteoarthritis type, unspecified site [M19.90])    Surgeons: Michael Antony MD Responsible Provider: Leanna Frazier MD    Anesthesia Type: spinal ASA Status: 3          Anesthesia Type: No value filed.     Enedina Phase I: Enedina Score: 9    Enedina Phase II:        Anesthesia Post Evaluation    Patient location during evaluation: PACU  Patient participation: complete - patient participated  Level of consciousness: awake and alert  Pain score: 3  Airway patency: patent  Nausea & Vomiting: no vomiting and no nausea  Complications: no  Cardiovascular status: hemodynamically stable  Respiratory status: spontaneous ventilation  Hydration status: stable

## 2022-10-26 NOTE — INTERVAL H&P NOTE
Update History & Physical    H&P reviewed. No changes.     Electronically signed by Emily Perez MD on 10/26/2022 at 6:48 AM

## 2022-10-26 NOTE — DISCHARGE SUMMARY
Silvia Shell  [id number]  60 y.o.  1960    Admit date: 10/26/2022    Discharge date and time: 10/27/2022    Admitting Physician: RICK Ortiz MD    Discharge Physician: Jesus Ortiz MD    Admission Diagnoses: right Hip Osteoarthritis    Discharge Diagnoses: Same    Admission Condition: Good    Discharged Condition: Good    Procedure and Date: right Total Hip Arthroplasty     Hospital Course: A total hip arthroplasty was performed on 10/26/2022. Following this procedure the patient was admitted for a 1 day postoperative hospital stay. During this admission, DVT prophylaxis was followed using bilateral ICDs and ASA. Post-operative pain control was achieved with the use of IV/oral pain medications. Discharge plans were discussed with the patient with the aid of . Disposition: Home    Patient Instructions:     Medications for pain:    Acetaminophen - Take one tablet every 6 hours for 1 week. Then take every 6 hours as needed for pain. Tramadol - Take one tablet every 6 hours for 1 week. Then take every 6 hours as needed for pain. Oxycodone 1 tablet every 4 hours as needed for pain. You can take 2 every 6 hours for severe pain. Medication for DVT prophylaxis (Prevention of blood clots)  Aspirin - Take 1 tablet daily for 6 weeks for prevention of blood clots    Medication for constipation:  Colace - Take 1 tablet every 12 hours as needed for constipation        Activity: Weight bear as tolerated with a walker. Posterior hip precautions. Diet: regular diet  Wound Care: Patient should remove dressing in 9 days. Patient can shower with the dressing on but should not bathe. After removing, the dressing keep incision clean and dry    Follow-up with Dr. Rafael Marcial in 2 weeks.

## 2022-10-26 NOTE — ANESTHESIA PROCEDURE NOTES
Spinal Block    Patient location during procedure: OR  End time: 10/26/2022 7:30 AM  Reason for block: primary anesthetic  Staffing  Performed: anesthesiologist, resident/CRNA and other anesthesia staff   Anesthesiologist: Gwen Britton MD  Resident/CRNA: ELIDIA Hoover - CLAIR  Other anesthesia staff: Derrell Lino RN  Additional Notes  Several attempts at placing spinal- unsuccessful - decision to proceed with general anesthesia

## 2022-10-26 NOTE — OP NOTE
Operative Report  Nitish Elise  [id number]  10/26/2022    Pre-operative diagnosis: Right hip degenerative joint disease    Post-operative diagnosis: Right hip degenerative joint disease    Procedure: Dheeraj right total hip arthroplasty    Surgeon: Gurmeet Birch MD    Assistant:  THU Montoya; assisted with positioning, retracting and closing     Anesthesia:  General    Operative Indication: Maximino Ansari is a 61 y/o male with severe right hip degenerative joint disease and worsening right hip pain for the past year. The pain has become a quality of life issue and the patient has failed nonoperative treatment. He presents for a total hip arthroplasty. The risks and benefits of surgery were discussed and all questions were answered. The risks for surgery include bleeding, infection, damage to blood vessels, damage to nerves, risk of further surgery, chronic pain,  restricted range of motion, risk of continued discomfort, risk of need for further reconstructive procedures, leg length discrepancy, risk of need for altered activities and altered gait, risk of blood clots, pulmonary embolism, myocardial infarction, and risk of death were discussed. The patient understood these risks and consented for surgery. EBL: 874 cc    Complications: None    Components: 1. [place] Accolade II Stem, 127°, size 6    2. [place] Trident cup, size 54    3. Yamilet ceramic head, size 36, -5    Operative Procedure: Following general anesthesia, the patient was positioned lateral decubitus with the body supported by a peg board. A [employer] robotic marker was placed on the patella superficially. The affected leg and hip were prepped and draped in the usual standard fashion. The down leg was padded and protected. The hip and groin were sealed with Michael Amass. An intra-operative time out was called to confirm the planned surgical procedure and administration of IV Ancef.  3 small skin incisions were made over the iliac crest posterior to the ASIS and 3 Vickie pins were placed. A [employer] satellite array was then affixed. A skin incision then was made centered over the greater trochanter. A posterior approach to the hip joint was used. The IT band and gluteus fascia were split and a Charnley retractor was placed for deep exposure. Bursal tissue was cleared over the trochanter and short external rotators. An assistant internally rotated the hip and a francis retractor was placed beneath the gluteus medius muscle. The plane between gluteus minimus and the piriformis tendon was developed using electrocautery. The piriformis and short external rotator tendons were released and tagged with #5 ethibond, along with release of a portion of the quadratus femoris muscle. The capsule was then incised in a T-fashion. It was tagged with #1 ethibond and deep retractors were repositioned. The hip was then dislocated. A superior marker was affixed to the acetabulum and another attached to the greater trochanter. Retractors were then repositioned and the femoral neck cut was then planned. A saw was then used to make the femoral neck cut. The femoral head was then removed. Retractors were then repositioned for exposure of the acetabulum. The labrum and pulivinar were then excised with a bovie. The acetabulum was then registered using the [facility] protocol. The [facility] robot was then employed with a plan of 40° abduction and 20° anterversion. The acetabulum was reamed to a size 54. A size 54 acetabular cup was then impacted. The polyethylene insert was then impacted into the cup. Attention was now turned to the femoral canal. The canal was entered using a box osteotome, followed by placement of a . Broaching was initiated with a size 0 broach and was taken up to a size 6 broach in 1mm increments. With the final broach in place, there was good canal fill and the implant was stable with rotation. A trial head and neck were placed and the hip was reduced.  The hip was stable on exam. The hip was then dislocated and trial implants removed. The hip was then thoroughly irrigated with normal saline. A size 6 Yamilet Accolade II stem was then impacted. It was once again trialed with a -5 size 36 head. With these components, the hip was stable and had excellent ROM. Final components were then impacted onto the stem and the hip was again reduced. The surgical site was irrigated using pulsatile lavage. The Vickie pins and all Dheeraj markers were removed. The capsule and external rotators were repaired with #5 ethibond through 2 drill holes in the greater trochanter. The IT band and gluteus fascia were closed using #1 Stratafix. The skin was closed in layers using 2-0, 3-0 Stratafix, dermabond and steri strips. A dry, sterile dressing was applied. The patient was then transferred to an orthopaedic bed with an abduction pillow between the legs. The patient was taken to recovery in stable condition.     Jon Browne MD

## 2022-10-26 NOTE — PROGRESS NOTES
Occupational Therapy    OCCUPATIONAL THERAPY INITIAL EVALUATION     [address] [facility]abe, [address]         QXG                                                  Patient Name: Lowell Santana MRN: [id number]    : 1960    Room: [address] [place] [address]      Evaluating OT: Graciella Moritz OTR/L   [id number]      Referring Matt Epstein MD    Specific Provider Orders/Date:OT eval and treat 10/26/2022      Diagnosis:  Osteoarthritis, unspecified osteoarthritis type, unspecified site [M19.90]  Primary osteoarthritis of right hip [M16.11]    Surgery: R FRANCESCA      Pertinent Medical History: B knee OA,      Precautions:  Fall Risk, WBAT R LE, posterolateral hip precautions,wear B knee braces      Assessment of current deficits    [x] Functional mobility  [x]ADLs  [x] Strength               []Cognition    [x] Functional transfers   [x] IADLs         [x] Safety Awareness   [x]Endurance    [] Fine Coordination              [x] Balance      [] Vision/perception   []Sensation     []Gross Motor Coordination  [] ROM  [] Delirium                   [] Motor Control     OT PLAN OF CARE   OT POC based on physician orders, patient diagnosis and results of clinical assessment    Frequency/Duration  2-4 days/wk for 2 weeks PRN   Specific OT Treatment Interventions to include:   ADL retraining/adapted techniques and AE recommendations to increase functional independence within precautions                    Energy conservation techniques to improve tolerance for selfcare routine   Functional transfer/mobility training/DME recommendations for increased independence, safety and fall prevention         Patient/family education to increase safety and functional independence             Environmental modifications for safe mobility and completion of ADLs                             Therapeutic activity to improve functional performance during ADLs. Therapeutic exercise to improve tolerance and functional strength for ADLs    Balance retraining/tolerance tasks for facilitation of postural control with dynamic challenges during ADLs . Positioning to improve functional independence      Recommended Adaptive Equipment: TBD     Home Living: Pt lives with wife (she is helping care for her elderly father)  son will be staying to help out.   Ranch with 3 steps/rail    Bathroom setup: tub/shower  Equipment owned: walker , reacher     Prior Level of Function: son assisting  with lower body ADLs , assist as needed  with IADLs; ambulated with no device       Pain Level: no pain this session ;   Cognition: A&O: 4/4;    Memory:  good    Sequencing:  good    Problem solving:  good    Judgement/safety:  good      Functional Assessment:  AM-PAC Daily Activity Raw Score: 17/24   Initial Eval Status  Date: 10/26/22 Treatment Status  Date: STGs = LTGs  Time frame: 10-14 days   Feeding Independent     Grooming CGA/SBA  Standing at sink  Mod I   UB Dressing Set-up  Mod I   LB Dressing Mod A    Educated on hip precautions and ADLs - continue to reinforce   supervision   Bathing [address]  supervision   Toileting Tenzin  Assist with using urinal  Independent   Bed Mobility  SBA  Supine to sit  Independent   Functional Transfers CGA/SBA  Sit-stand from bed,elevated commode  Mod I   Functional Mobility Min/CGA,w/walker  Ambulated to/from bathroom  B Knee pain/instability  supervision with good tolerance    Balance Sitting:     Static:  Independent    Dynamic:Tenzin  Standing: CGA/SBA  Independent/supervision   Activity Tolerance No SOB  good with ADL activity    Visual/  Perceptual Glasses: none by bedside                Hand Dominance right   AROM (PROM) Strength Additional Info:    RUE  WFL WFL good  and wfl FMC/dexterity noted during ADL tasks       LUE WFL WFL good  and wfl FMC/dexterity noted during ADL tasks       [facility]   Sensation:  No c/o numbness or tingling   Tone: WFL  Edema: none observed     Comments: Upon arrival patient lying in bed . At end of session, patient sitting in chair  with call light and phone within reach, all lines and tubes intact. Overall patient demonstrated  decreased independence and safety during completion of ADL/functional transfer/mobility tasks. Pt would benefit from continued skilled OT to increase safety and independence with completion of ADL/IADL tasks for functional independence and quality of life. Rehab Potential: good for established goals     Patient / Family Goal: return home      Patient and/or family were instructed on functional diagnosis, prognosis/goals and OT plan of care. Demonstrated good  understanding. Eval Complexity: Low    Time In: 1430  Time Out: 1445      Min Units   OT Eval Low 97165 x  1   OT Eval Medium 07313      OT Eval High 13008      OT Re-Eval [id number]       Therapeutic Ex 48239      Therapeutic Activities 20290       ADL/Self Care 41907       Orthotic Management 31452       Manual 90332     Neuro Re-Ed 07257       Non-Billable Time          Evaluation Time additionally includes thorough review of current medical information, gathering information on past medical history/social history and prior level of function, interpretation of standardized testing/informal observation of tasks, assessment of data and development of plan of care and goals.             Calvin Light  OTR/L  OT [id number]

## 2022-10-26 NOTE — PROGRESS NOTES
Physical Therapy  Facility/Department: [facility] SURGERY  Physical Therapy Initial Assessment    Name: Cm Jeong. : 1960  MRN: [id number]  Date of Service: 10/26/2022             Patient Diagnosis(es): The encounter diagnosis was Osteoarthritis, unspecified osteoarthritis type, unspecified site. Past Medical History:  has a past medical history of Abdominal hernia, Arthritis, Bilateral knee pain, Diabetes mellitus ([address] Diverticulitis, Hyperlipidemia, OKSANA on CPAP, and Right hip pain. Past Surgical History:  has a past surgical history that includes Knee arthroscopy; hernia repair; Abdomen surgery (); Abdomen surgery (); tumor excision (CHILD); myringotomy (03/15/2012); Cataract extraction, extracapsular w/ intraocular lens implant (Bilateral); Colonoscopy; and Total hip arthroplasty (Right, 10/26/2022). Requires PT Follow-Up: Yes      Evaluating Therapist: Dalia Carlisle PT     Referring Provider:  Dr. Amanda Church     PT order : PT eval and treat     Room #:  706   DIAGNOSIS:  OA s/p R FRANCESCA 10/26/2022   PRECAUTIONS: falls, posterolateral hip, FWBAT     Social:  Pt lives with  wife  in a  1  floor plan  3  steps and  B  rails to enter. Prior to admission pt walked with no AD. Has cane, ya . Pt wears B knee braces     Initial Evaluation  Date:  10/26/2022  Treatment      Short Term/ Long Term   Goals   Was pt agreeable to Eval/treatment? Yes      Does pt have pain?   Minimal R hip pain      Bed Mobility  Rolling:  NT   Supine to sit:  SBA   Sit to supine:  NT   Scooting:  SBA in sit    S/I    Transfers Sit to stand:  CGA   Stand to sit:  CGA   Stand pivot:  NT    S/I    Ambulation     140 and 15  feet with  ww  with  CGA    200  feet with  ww  with  S/I        Stair negotiation: ascended and descended NT    4  steps with  1-2  rail with  SBA    LE ROM  Decreased throughout  R hip, distally WFL      LE strength  3-/ 5 R hip, distally 4/ 5      AM- PAC RAW score  18/ 24            Pt is alert and Oriented x  4      Balance:  CGA . Fall r isk due to decreased balance and strength   Endurance: WFL   Bed/Chair alarm:  no      ASSESSMENT  Pt displays functional ability as noted in the objective portion of this evaluation. Conditions Requiring Skilled Therapeutic Intervention:    [x]Decreased strength     []Decreased ROM  [x]Decreased functional mobility  [x]Decreased balance   [x]Decreased endurance   [x]Decreased posture  []Decreased sensation  []Decreased coordination   []Decreased vision  []Decreased safety awareness   [x]Increased pain         Treatment/Education:    Pt in bed  upon arrival ; agreeable to PT. Instructed in hip precautions prior to mobility. Mobility as above. Cues needed for hand placement with transfers and ww safety with gait. Mildly unsteady with gait. Pt reports he wears knee braces  - not donned due to informed while pt walking. Pt educated on fall risk,  safety with mobility        Patient response to education:   Pt verbalized understanding Pt demonstrated skill Pt requires further education in this area   x  With cues   x       Comments:  Pt left  in chair after session, with call light in reach. Rehab potential is Good for reaching above PT goals. Pts/ family goals   1. Home     Patient and or family understand(s) diagnosis, prognosis, and plan of care. -  yes     PLAN  PT care will be provided in accordance with the objectives noted above. Whenever appropriate, clear delegation orders will be provided for nursing staff. Exercises and functional mobility practice will be used as well as appropriate assistive devices or modalities to obtain goals. Patient and family education will also be administered as needed.         PLAN OF CARE:    Current Treatment Recommendations     [x] Strengthening to improve independence with functional mobility   [x] ROM to improve independence with functional mobility   [x] Balance Training to improve static/dynamic balance and to reduce fall risk  [x] Endurance Training to improve activity tolerance during functional mobility   [x] Transfer Training to improve safety and independence with all functional transfers   [x] Gait Training to improve gait mechanics, endurance and assess need for appropriate assistive device  [x] Stair Training in preparation for safe discharge home and/or into the community   [x] Positioning to prevent skin breakdown and contractures  [x] Safety and Education Training   [x] Patient/Caregiver Education   [] HEP  [] Other     Frequency of treatments will be BID x 2 days. Time in: 1415  Time out:  1443       Evaluation Time includes thorough review of current medical information, gathering information on past medical history/social history and prior level of function, completion of standardized testing/informal observation of tasks, assessment of data and education on plan of care and goals.     CPT codes:  [x] Low Complexity PT evaluation 31702  [] Moderate Complexity PT evaluation 47115  [] High Complexity PT evaluation 56576  [] PT Re-evaluation 32554  [] Gait training 70833  minutes  [x] Therapeutic activities 39247 8 minutes  [] Therapeutic exercises 25116  minutes  [] Neuromuscular reeducation 50686  minutes       Mir 18 number:  PT 9979

## 2022-10-26 NOTE — CARE COORDINATION
Met with patient about diagnosis and discharge plan of care. Post op right FRANCESCA. Pt seen in ortho class. Lives with spouse in ranch home. 4 steps in. Has wheeled walker. Needs toilet riser with arms-ordered through [employer] [facility]. Plan is home with [place] [facility]-orders completed and notified. Will follow-mjo    The Plan for Transition of Care is related to the following treatment goals: home with home care     The Patient and/or patient representative pt was provided with a choice of provider and agrees   with the discharge plan. [x] Yes [] No    Freedom of choice list was provided with basic dialogue that supports the patient's individualized plan of care/goals, treatment preferences and shares the quality data associated with the providers.  [x] Yes [] No

## 2022-10-27 VITALS
SYSTOLIC BLOOD PRESSURE: 134 MMHG | WEIGHT: 263 LBS | TEMPERATURE: 98.1 F | BODY MASS INDEX: 41.28 KG/M2 | RESPIRATION RATE: 16 BRPM | HEART RATE: 97 BPM | DIASTOLIC BLOOD PRESSURE: 77 MMHG | OXYGEN SATURATION: 96 % | HEIGHT: 67 IN

## 2022-10-27 LAB
ANION GAP SERPL CALCULATED.3IONS-SCNC: 15 MMOL/L (ref 7–16)
BUN BLDV-MCNC: 15 MG/DL (ref 6–23)
CALCIUM SERPL-MCNC: 8.9 MG/DL (ref 8.6–10.2)
CHLORIDE BLD-SCNC: 101 MMOL/L (ref 98–107)
CO2: 19 MMOL/L (ref 22–29)
CREAT SERPL-MCNC: 0.8 MG/DL (ref 0.7–1.2)
GFR SERPL CREATININE-BSD FRML MDRD: >60 ML/MIN/1.73
GLUCOSE BLD-MCNC: 146 MG/DL (ref 74–99)
HCT VFR BLD CALC: 43.9 % (ref 37–54)
HEMOGLOBIN: 14.3 G/DL (ref 12.5–16.5)
MCH RBC QN AUTO: 30.2 PG (ref 26–35)
MCHC RBC AUTO-ENTMCNC: 32.6 % (ref 32–34.5)
MCV RBC AUTO: 92.8 FL (ref 80–99.9)
METER GLUCOSE: 123 MG/DL (ref 74–99)
PDW BLD-RTO: 12.9 FL (ref 11.5–15)
PLATELET # BLD: 253 E9/L (ref 130–450)
PMV BLD AUTO: 9.1 FL (ref 7–12)
POTASSIUM SERPL-SCNC: 4.3 MMOL/L (ref 3.5–5)
RBC # BLD: 4.73 E12/L (ref 3.8–5.8)
SODIUM BLD-SCNC: 135 MMOL/L (ref 132–146)
WBC # BLD: 20.1 E9/L (ref 4.5–11.5)

## 2022-10-27 PROCEDURE — 6360000002 HC RX W HCPCS: Performed by: ORTHOPAEDIC SURGERY

## 2022-10-27 PROCEDURE — 80048 BASIC METABOLIC PNL TOTAL CA: CPT

## 2022-10-27 PROCEDURE — 82962 GLUCOSE BLOOD TEST: CPT

## 2022-10-27 PROCEDURE — 97530 THERAPEUTIC ACTIVITIES: CPT

## 2022-10-27 PROCEDURE — 6370000000 HC RX 637 (ALT 250 FOR IP): Performed by: ORTHOPAEDIC SURGERY

## 2022-10-27 PROCEDURE — G0378 HOSPITAL OBSERVATION PER HR: HCPCS

## 2022-10-27 PROCEDURE — 36415 COLL VENOUS BLD VENIPUNCTURE: CPT

## 2022-10-27 PROCEDURE — 97535 SELF CARE MNGMENT TRAINING: CPT

## 2022-10-27 PROCEDURE — 85027 COMPLETE CBC AUTOMATED: CPT

## 2022-10-27 PROCEDURE — 2580000003 HC RX 258: Performed by: ORTHOPAEDIC SURGERY

## 2022-10-27 RX ADMIN — SODIUM CHLORIDE, PRESERVATIVE FREE 10 ML: 5 INJECTION INTRAVENOUS at 09:02

## 2022-10-27 RX ADMIN — KETOROLAC TROMETHAMINE 15 MG: 30 INJECTION, SOLUTION INTRAMUSCULAR at 01:46

## 2022-10-27 RX ADMIN — METFORMIN HYDROCHLORIDE 1000 MG: 1000 TABLET ORAL at 09:01

## 2022-10-27 RX ADMIN — ASPIRIN 325 MG: 325 TABLET, COATED ORAL at 09:01

## 2022-10-27 RX ADMIN — ACETAMINOPHEN 650 MG: 325 TABLET ORAL at 01:46

## 2022-10-27 RX ADMIN — DOCUSATE SODIUM 50 MG AND SENNOSIDES 8.6 MG 1 TABLET: 8.6; 5 TABLET, FILM COATED ORAL at 09:01

## 2022-10-27 RX ADMIN — ATORVASTATIN CALCIUM 10 MG: 10 TABLET, FILM COATED ORAL at 09:01

## 2022-10-27 RX ADMIN — DEXAMETHASONE SODIUM PHOSPHATE 10 MG: 10 INJECTION INTRAMUSCULAR; INTRAVENOUS at 06:42

## 2022-10-27 RX ADMIN — KETOROLAC TROMETHAMINE 15 MG: 30 INJECTION, SOLUTION INTRAMUSCULAR at 09:01

## 2022-10-27 ASSESSMENT — PAIN SCALES - GENERAL
PAINLEVEL_OUTOF10: 2
PAINLEVEL_OUTOF10: 5

## 2022-10-27 ASSESSMENT — PAIN DESCRIPTION - LOCATION: LOCATION: HIP

## 2022-10-27 ASSESSMENT — PAIN DESCRIPTION - DESCRIPTORS: DESCRIPTORS: ACHING;DISCOMFORT;SORE;TENDER

## 2022-10-27 ASSESSMENT — PAIN DESCRIPTION - ORIENTATION: ORIENTATION: RIGHT

## 2022-10-27 ASSESSMENT — PAIN - FUNCTIONAL ASSESSMENT: PAIN_FUNCTIONAL_ASSESSMENT: ACTIVITIES ARE NOT PREVENTED

## 2022-10-27 NOTE — PROGRESS NOTES
Pt ambulated in the hallway and in his room throughout the night. Tolerated it well and has no complaints at this time. Pt is utilizing his incentive spirometer often. Pt hasn't asked for pain medication as he said the tylenol and toradol is helping him.

## 2022-10-27 NOTE — PROGRESS NOTES
Orthopaedic Surgery Progress Note  RICK Jaimes MD      SUBJECTIVE:  No acute events over night. Pain controlled. Denies chest pain or shortness of breath. OBJECTIVE:   AAOx3, NAD    Right lower extremity    Dressings C/D/I  SILT L1-S1  TA/EHL/GS intact  Calf soft, NT  +2 DP, W/WP     Data:  CBC:   Lab Results   Component Value Date/Time    WBC 20.1 10/27/2022 02:01 AM    RBC 4.73 10/27/2022 02:01 AM    HGB 14.3 10/27/2022 02:01 AM    HCT 43.9 10/27/2022 02:01 AM    MCV 92.8 10/27/2022 02:01 AM    MCH 30.2 10/27/2022 02:01 AM    MCHC 32.6 10/27/2022 02:01 AM    RDW 12.9 10/27/2022 02:01 AM     10/27/2022 02:01 AM    MPV 9.1 10/27/2022 02:01 AM     BMP:    Lab Results   Component Value Date/Time     10/27/2022 02:01 AM    K 4.3 10/27/2022 02:01 AM     10/27/2022 02:01 AM    CO2 19 10/27/2022 02:01 AM    BUN 15 10/27/2022 02:01 AM    CREATININE 0.8 10/27/2022 02:01 AM    CALCIUM 8.9 10/27/2022 02:01 AM    GFRAA >60 02/25/2020 11:02 AM    LABGLOM >60 10/27/2022 02:01 AM    GLUCOSE 146 10/27/2022 02:01 AM         A/P: POD 1 right FRANCESCA  - WBAT  - Pain control  - PT/OT  - DVT prophylaxis  - D/C planning for home today    RICK Jaimes MD

## 2022-10-27 NOTE — PROGRESS NOTES
CLINICAL PHARMACY NOTE: MEDS TO BEDS    Total # of Prescriptions Filled: 4   The following medications were delivered to the patient:  Oxycodone 10  Tylenol 325  Asp 325   Senexon    Additional Documentation:

## 2022-10-27 NOTE — PLAN OF CARE
Problem: Chronic Conditions and Co-morbidities  Goal: Patient's chronic conditions and co-morbidity symptoms are monitored and maintained or improved  Outcome: Progressing     Problem: Discharge Planning  Goal: Discharge to home or other facility with appropriate resources  Outcome: Progressing     Problem: Pain  Goal: Verbalizes/displays adequate comfort level or baseline comfort level  Outcome: Progressing     Problem: ABCDS Injury Assessment  Goal: Absence of physical injury  Outcome: Progressing     Problem: Safety - Adult  Goal: Free from fall injury  Outcome: Progressing

## 2022-10-27 NOTE — CONSULTS
[address] [address]                                  CONSULTATION    PATIENT NAME: Frank Narvaez                         :        1960  MED REC NO:   [id number]                            ROOM:       0518  ACCOUNT NO:   [de-identified]                           ADMIT DATE: 10/26/2022  PROVIDER:     Mark James DO    CONSULT DATE:  10/26/2022    CHIEF COMPLAINT:  Right hip pain with severe degenerative arthritis. HISTORY OF PRESENT ILLNESS:  This is a 69-year-old male who presented  [facility] for elective right total hip  arthroplasty. The patient has been suffering from pain with inhibition  of ambulation for an extended period of time. Arrangements were made  for a total hip arthroplasty and went without complications on the date  of admission. I was able to see him postoperatively in that he is a  regular patient of mine. He was free of any chest pain, shortness of  breath, abdominal pain, nausea, vomiting, headaches, or dizziness and  stated that pain was well-controlled. PAST MEDICAL HISTORY:  Consists of diabetes type 2, hyperlipidemia,  obstructive sleep apnea, diverticulitis, abdominal hernia, and diffuse  degenerative joint disease. PAST SURGICAL HISTORY:  Abdominal surgery including bowel resection with  reversal, cataract repair, hernia repair, stomach wall, left knee  arthroscopy, myringotomy, and tumor excision of posterior left ear as  well as diagnostic colonoscopy. MEDICATIONS:  Please see chart. ALLERGIES:  No known drug allergies. SOCIAL HISTORY:  Denies any tobacco abuse. Drinks alcohol on occasion. Denies any recreational drug abuse. FAMILY HISTORY:  No family history noted at the present time. PHYSICAL EXAMINATION:  VITAL SIGNS:  Temperature 97.8, pulse 56, respirations 20, blood  pressure 132/75, pulse ox 96%.   GENERAL:  A well-developed, obese middle-aged _____ man who appears his  stated age. He is alert and oriented, answers questions appropriately,  and is friendly and willing to cooperate with physical examination. He  is presently in no acute distress. Postoperatively somewhat lethargic. HEENT:  Atraumatic and normocephalic. Pupils are equal, round, and  reactive to light. Extraocular movements are intact. Nares are patent. External auditory canals are nonerythematous. Pharynx is clear. NECK:  Supple. No bruits. No cervical lymphadenopathy. SKIN:  No bruising, scarring, cyanosis, or edema. Did not it is inspect  surgical incision. HEART:  Regular rate and rhythm. No murmurs. LUNGS:  Clear to auscultation bilaterally without wheezes or rales. ABDOMEN:  Soft and nontender with bowel sounds present in all four  quadrant. He does have a large abdominal wall hernia. EXTREMITIES:  Upper extremity and left lower extremity exhibit full  range of motion and 5/5 strength. Did not perform range of motion  testing on affected limb. NEUROLOGIC:  Cranial nerves II through XII are grossly intact. No focal  neurologic deficits noted. MUSCULOSKELETAL:  Did not examine spine secondary to the patient's  supine position. ASSESSMENT:  1. DJD right hip. 2.  Diabetes type 2.  3.  Hypertension, controlled. PLAN:  The patient was seen postoperatively and seemed to be in stable  condition. Routine medications for diabetes were continued per  Orthopedics. Pain management per Orthopedics as well as physical  therapy per Orthopedics. I do not expect that the patient should need  an extended care, acute care, and therefore I will follow the patient  from this point going forward on an as-needed basis.         Obdulia Judd DO    D: 10/26/2022 18:17:21       T: 10/26/2022 18:20:24     [email or web address]  Job#: [id number]     Doc#: [id number]    CC:

## 2022-10-27 NOTE — PROGRESS NOTES
Physical Therapy  Facility/Department: [address] ORTHO SURGERY  Daily Treatment Note  NAME: Charu Dacosta. : 1960  MRN: [id number]    Date of Service: 10/27/2022    Patient Diagnosis(es): The encounter diagnosis was Osteoarthritis, unspecified osteoarthritis type, unspecified site. Past Medical History:  has a past medical history of Abdominal hernia, Arthritis, Bilateral knee pain, Diabetes mellitus ([address] Diverticulitis, Hyperlipidemia, OKSANA on CPAP, and Right hip pain. Past Surgical History:  has a past surgical history that includes Knee arthroscopy; hernia repair; Abdomen surgery (); Abdomen surgery (); tumor excision (CHILD); myringotomy (03/15/2012); Cataract extraction, extracapsular w/ intraocular lens implant (Bilateral); Colonoscopy; and Total hip arthroplasty (Right, 10/26/2022). Requires PT Follow-Up: Yes      Evaluating Therapist: Tashi Cavazos PT      Referring Provider:  Dr. João Reeves      PT order : PT eval and treat      Room #:  706   DIAGNOSIS:  OA s/p R FRANCESCA 10/26/2022   PRECAUTIONS: falls, posterolateral hip, FWBAT      Social:  Pt lives with  wife  in a  1  floor plan  3  steps and  B  rails to enter. Prior to admission pt walked with no AD. Has cane, ww . Pt wears B knee braces       Initial Evaluation  Date:  10/26/2022  Treatment     10/27/2022  Short Term/ Long Term   Goals   Was pt agreeable to Eval/treatment? Yes  Yes      Does pt have pain?   Minimal R hip pain   incisional R hip pain      Bed Mobility  Rolling:  NT   Supine to sit:  SBA   Sit to supine:  NT   Scooting:  SBA in sit   Supine <> sit : S/I   S/I    Transfers Sit to stand:  CGA   Stand to sit:  CGA   Stand pivot:  NT   Sit <> stand : Supervision /I  S/I    Ambulation     140 and 15  feet with  ww  with  CGA  50 and 160 feet x1 with ww Supervision   200  feet with  ww  with  S/I          Stair negotiation: ascended and descended NT   4 steps with B HRs SBA   4  steps with  1-2  rail with  SBA    LE ROM Decreased throughout  R hip, distally WFL        LE strength  3-/ 5 R hip, distally 4/ 5        AM- PAC RAW score  18/ 24 20/ 24               Pt is alert and Oriented x  4       Balance:  S/I . Fall risk due to decreased balance and strength   Endurance: WFL   Bed/Chair alarm:  no      ASSESSMENT    Pt displays functional ability as noted in the objective portion of this treatment         Conditions Requiring Skilled Therapeutic Intervention:     [x]Decreased strength                                      []Decreased ROM  [x]Decreased functional mobility  [x]Decreased balance   [x]Decreased endurance   [x]Decreased posture  []Decreased sensation  []Decreased coordination   []Decreased vision  []Decreased safety awareness   [x]Increased pain                Treatment/Education:    Pt in chair   upon arrival ; agreeable to PT. Re-Instructed in hip precautions prior to mobility - pt was not able to recall all hip precautions. . Mobility as above. Cues needed for hand placement with transfers and ww safety with gait. Mildly unsteady with gait. Pt wore R knee brace , but did not want to wear L knee brace. Instructed in sequencing with stairs and proper technique with car transfer. Performed seated LAQS and APs. Pt educated on fall risk,  safety with mobility         Patient response to education:   Pt verbalized understanding Pt demonstrated skill Pt requires further education in this area   x  With cues   x         Comments:  Pt left  in chair after session, with call light in reach. PLAN    PT care will be provided in accordance with the objectives noted above. Whenever appropriate, clear delegation orders will be provided for nursing staff. Exercises and functional mobility practice will be used as well as appropriate assistive devices or modalities to obtain goals. Patient and family education will also be administered as needed.            PLAN OF CARE:     Current Treatment Recommendations      [x] Strengthening to improve independence with functional mobility   [x] ROM to improve independence with functional mobility   [x] Balance Training to improve static/dynamic balance and to reduce fall risk  [x] Endurance Training to improve activity tolerance during functional mobility   [x] Transfer Training to improve safety and independence with all functional transfers   [x] Gait Training to improve gait mechanics, endurance and assess need for appropriate assistive device  [x] Stair Training in preparation for safe discharge home and/or into the community   [x] Positioning to prevent skin breakdown and contractures  [x] Safety and Education Training   [x] Patient/Caregiver Education   [] HEP  [] Other      Frequency of treatments will be BID x 2 days. Time in: 0821  Time out:  0845        Pt has grossly met all goals; plan is for discharge today.       CPT codes:  [] Low Complexity PT evaluation 98931  [] Moderate Complexity PT evaluation 36955  [] High Complexity PT evaluation 55028  [] PT Re-evaluation 41503  [] Gait training 06182  minutes  [x] Therapeutic activities 35991 19 minutes  [] Therapeutic exercises 07019 5  minutes  [] Neuromuscular reeducation 28551  minutes         Mir 18 number:  PT 0344

## 2022-10-27 NOTE — CARE COORDINATION
Updated plan of care. POD#1. Plan discharge today. Awaiting toilet riser.  Home with [id number]

## 2022-10-27 NOTE — PROGRESS NOTES
Occupational Therapy  OT BEDSIDE TREATMENT NOTE      Date:10/27/2022  Patient Name: Link Solis MRN: [id number]  : 1960  Room: [address] [place], [place] [address]       Evaluating OT: Adan Richards OTR/L   [id number]       Referring Zenia Bowen MD    Specific Provider Orders/Date:OT eval and treat 10/26/2022       Diagnosis:  Osteoarthritis, unspecified osteoarthritis type, unspecified site [M19.90]  Primary osteoarthritis of right hip [M16.11]    Surgery: R FRANCESCA      Pertinent Medical History: B knee OA,      Precautions:  Fall Risk, WBAT R LE, posterolateral hip precautions, B knee braces       Assessment of current deficits    [x] Functional mobility            [x]ADLs           [x] Strength                  []Cognition    [x] Functional transfers          [x] IADLs         [x] Safety Awareness   [x]Endurance    [] Fine Coordination                         [x] Balance      [] Vision/perception   []Sensation      []Gross Motor Coordination             [] ROM           [] Delirium                   [] Motor Control      OT PLAN OF CARE   OT POC based on physician orders, patient diagnosis and results of clinical assessment     Frequency/Duration  2-4 days/wk for 2 weeks PRN   Specific OT Treatment Interventions to include:   ADL retraining/adapted techniques and AE recommendations to increase functional independence within precautions                    Energy conservation techniques to improve tolerance for selfcare routine   Functional transfer/mobility training/DME recommendations for increased independence, safety and fall prevention         Patient/family education to increase safety and functional independence             Environmental modifications for safe mobility and completion of ADLs                             Therapeutic activity to improve functional performance during ADLs.                                          Therapeutic exercise to improve tolerance and functional strength for ADLs    Balance retraining/tolerance tasks for facilitation of postural control with dynamic challenges during ADLs . Positioning to improve functional independence        Recommended Adaptive Equipment: TBD      Home Living: Pt lives with wife (she is helping care for her elderly father)  son will be staying to help out. Ranch with 3 steps/rail    Bathroom setup: tub/shower  Equipment owned: walker , reacher      Prior Level of Function: son assisting  with lower body ADLs , assist as needed  with IADLs; ambulated with no device         Pain Level: pt reporting surgical pain. Cognition: Awake and alert. Cues for safety. Functional Assessment:  AM-PAC Daily Activity Raw Score: 18/24    Initial Eval Status  Date: 10/26/22 Treatment Status  Date:10/27/22  STGs = LTGs  Time frame: 10-14 days   Feeding Independent       Grooming CGA/SBA  Standing at sink Setup   Mod I   UB Dressing Set-up Setup   Mod I   LB Dressing Mod A     Educated on hip precautions and ADLs - continue to reinforce   pt instructed with posterolateral hip precautions and use of adaptive equipment for LB dressing. Demonstrated use of AE for dressing SBA. supervision   Bathing ModA Tub transfer using grab bar CGA for safety. Pt states he has indwelling shower chair. supervision   Toileting Tenzin  Assist with using urinal Instructed with need for elevated toilet surface due to hip precautions. Pt verbalized understanding. Independent   Bed Mobility  SBA  Supine to sit   Independent   Functional Transfers CGA/SBA  Sit-stand from bed,elevated commode SBA from chair. Pt instructed with need to sit on elevated surfaces due to hip precautions. Mod I   Functional Mobility Min/CGA,w/walker  Ambulated to/from bathroom  B Knee pain/instability SBA using w/w   supervision with good tolerance    Balance Sitting:     Static:  Independent    Dynamic:Tenzin  Standing: CGA/SBA SBA stand balance during ADL.      Independent/supervision Activity Tolerance No SOB Fair   good with ADL activity      Comments:  Pt instructed with hip precautions, adaptive equipment, and positioning. Hip kit issued to pt this session after demonstrating good use of AE. He stated that he will have assistance from his son as needed. Tub transfer completed. Recommended that he complete tub transfer in home environment with home health to assess safety. Pt remained seated in the chair with no other questions regarding hip precautions or home safety. Education/treatment:  ADL retraining with facilitation of movement to increase self care skills. Therapeutic activity to address balance and endurance for ADL and transfers. Pt education of adaptive equipment training, walker safety, transfer safety, posterolateral hip precautions, and home safety. Pt has made  progress towards set goals.        Time In: 7:50   Time Out: 8:30      Min Units   Therapeutic Ex 76661     Therapeutic Activities 91870 10 1   ADL/Self Care 89819 30 2   Orthotic Management 71457     Neuro Re-Ed 52132     Non-Billable Time     TOTAL TIMED TREATMENT 40 1239 [facility] RENNY/L 59076

## 2022-11-02 ENCOUNTER — TRANSCRIBE ORDERS (OUTPATIENT)
Dept: ADMINISTRATIVE | Age: 62
End: 2022-11-02

## 2022-11-02 ENCOUNTER — HOSPITAL ENCOUNTER (EMERGENCY)
Age: 62
Discharge: HOME | End: 2022-11-02
Attending: EMERGENCY MEDICINE
Payer: COMMERCIAL

## 2022-11-02 ENCOUNTER — HOSPITAL ENCOUNTER (OUTPATIENT)
Dept: ULTRASOUND IMAGING | Age: 62
Discharge: HOME | End: 2022-11-04
Payer: COMMERCIAL

## 2022-11-02 VITALS
WEIGHT: 263 LBS | SYSTOLIC BLOOD PRESSURE: 142 MMHG | OXYGEN SATURATION: 94 % | RESPIRATION RATE: 18 BRPM | DIASTOLIC BLOOD PRESSURE: 86 MMHG | HEART RATE: 86 BPM | TEMPERATURE: 97.3 F | HEIGHT: 67 IN | BODY MASS INDEX: 41.28 KG/M2

## 2022-11-02 DIAGNOSIS — M79.661 PAIN OF RIGHT LOWER LEG: Primary | ICD-10-CM

## 2022-11-02 DIAGNOSIS — M79.661 PAIN OF RIGHT LOWER LEG: ICD-10-CM

## 2022-11-02 DIAGNOSIS — I82.401 ACUTE DEEP VEIN THROMBOSIS (DVT) OF RIGHT LOWER EXTREMITY, UNSPECIFIED VEIN (HCC): Primary | ICD-10-CM

## 2022-11-02 LAB
ALBUMIN SERPL-MCNC: 3.6 G/DL (ref 3.5–5.2)
ALP BLD-CCNC: 41 U/L (ref 40–129)
ALT SERPL-CCNC: 31 U/L (ref 0–40)
ANION GAP SERPL CALCULATED.3IONS-SCNC: 12 MMOL/L (ref 7–16)
APTT: 29.1 SEC (ref 24.5–35.1)
AST SERPL-CCNC: 28 U/L (ref 0–39)
BASOPHILS ABSOLUTE: 0.15 E9/L (ref 0–0.2)
BASOPHILS RELATIVE PERCENT: 1 % (ref 0–2)
BILIRUB SERPL-MCNC: 0.6 MG/DL (ref 0–1.2)
BUN BLDV-MCNC: 11 MG/DL (ref 6–23)
CALCIUM SERPL-MCNC: 9.5 MG/DL (ref 8.6–10.2)
CHLORIDE BLD-SCNC: 100 MMOL/L (ref 98–107)
CO2: 27 MMOL/L (ref 22–29)
CREAT SERPL-MCNC: 0.8 MG/DL (ref 0.7–1.2)
EOSINOPHILS ABSOLUTE: 0.31 E9/L (ref 0.05–0.5)
EOSINOPHILS RELATIVE PERCENT: 2 % (ref 0–6)
GFR SERPL CREATININE-BSD FRML MDRD: >60 ML/MIN/1.73
GLUCOSE BLD-MCNC: 122 MG/DL (ref 74–99)
HCT VFR BLD CALC: 44 % (ref 37–54)
HEMOGLOBIN: 14.4 G/DL (ref 12.5–16.5)
IMMATURE GRANULOCYTES #: 0.1 E9/L
IMMATURE GRANULOCYTES %: 0.7 % (ref 0–5)
INR BLD: 1.1
LYMPHOCYTES ABSOLUTE: 2.14 E9/L (ref 1.5–4)
LYMPHOCYTES RELATIVE PERCENT: 14.1 % (ref 20–42)
MCH RBC QN AUTO: 29.8 PG (ref 26–35)
MCHC RBC AUTO-ENTMCNC: 32.7 % (ref 32–34.5)
MCV RBC AUTO: 90.9 FL (ref 80–99.9)
MONOCYTES ABSOLUTE: 1.11 E9/L (ref 0.1–0.95)
MONOCYTES RELATIVE PERCENT: 7.3 % (ref 2–12)
NEUTROPHILS ABSOLUTE: 11.33 E9/L (ref 1.8–7.3)
NEUTROPHILS RELATIVE PERCENT: 74.9 % (ref 43–80)
PDW BLD-RTO: 12.8 FL (ref 11.5–15)
PLATELET # BLD: 343 E9/L (ref 130–450)
PMV BLD AUTO: 8.5 FL (ref 7–12)
POTASSIUM SERPL-SCNC: 4 MMOL/L (ref 3.5–5)
PROTHROMBIN TIME: 12.4 SEC (ref 9.3–12.4)
RBC # BLD: 4.84 E12/L (ref 3.8–5.8)
SODIUM BLD-SCNC: 139 MMOL/L (ref 132–146)
TOTAL PROTEIN: 6.6 G/DL (ref 6.4–8.3)
WBC # BLD: 15.1 E9/L (ref 4.5–11.5)

## 2022-11-02 PROCEDURE — 85610 PROTHROMBIN TIME: CPT

## 2022-11-02 PROCEDURE — 99284 EMERGENCY DEPT VISIT MOD MDM: CPT

## 2022-11-02 PROCEDURE — 96372 THER/PROPH/DIAG INJ SC/IM: CPT

## 2022-11-02 PROCEDURE — 80053 COMPREHEN METABOLIC PANEL: CPT

## 2022-11-02 PROCEDURE — 85730 THROMBOPLASTIN TIME PARTIAL: CPT

## 2022-11-02 PROCEDURE — 6360000002 HC RX W HCPCS: Performed by: EMERGENCY MEDICINE

## 2022-11-02 PROCEDURE — 85025 COMPLETE CBC W/AUTO DIFF WBC: CPT

## 2022-11-02 PROCEDURE — 93971 EXTREMITY STUDY: CPT

## 2022-11-02 RX ORDER — ENOXAPARIN SODIUM 150 MG/ML
1 INJECTION SUBCUTANEOUS ONCE
Status: COMPLETED | OUTPATIENT
Start: 2022-11-02 | End: 2022-11-02

## 2022-11-02 RX ADMIN — ENOXAPARIN SODIUM 120 MG: 150 INJECTION SUBCUTANEOUS at 19:05

## 2022-11-02 ASSESSMENT — ENCOUNTER SYMPTOMS
APNEA: 0
BACK PAIN: 0
ABDOMINAL PAIN: 0
EYE ITCHING: 0
CHEST TIGHTNESS: 0
EYE DISCHARGE: 0
ABDOMINAL DISTENTION: 0

## 2022-11-02 NOTE — ED TRIAGE NOTES
FIRST PROVIDER CONTACT ASSESSMENT NOTE       Department of Emergency Medicine                 First Provider Note            22  1:18 PM EDT    Date of Encounter: No admission date for patient encounter. Patient Name: Charu Ro : 1960  MRN: [id number]    Chief Complaint: Leg Pain (Right DVT  from u/s    hx TRH  10-26/)      History of Present Illness:   Charu Ro is a 60 y.o. male who presents to the ED for right hip replacement on 10/26 by Dr. Lida Pichardo. Swelling in right lower leg for 2 days. + blood clot on ultrasound. He denies CP or SOB     Focused Physical Exam:  VS:    ED Triage Vitals   BP Temp Temp src Pulse Resp SpO2 Height Weight   -- -- -- -- -- -- -- --        Physical Ex: Constitutional: Alert and non-toxic. Medical History:  has a past medical history of Abdominal hernia, Arthritis, Bilateral knee pain, Diabetes mellitus ([address] Diverticulitis, Hyperlipidemia, OKSANA on CPAP, and Right hip pain. Surgical History:  has a past surgical history that includes Knee arthroscopy; hernia repair; Abdomen surgery (); Abdomen surgery (); tumor excision (CHILD); myringotomy (03/15/2012); Cataract extraction, extracapsular w/ intraocular lens implant (Bilateral); Colonoscopy; and Total hip arthroplasty (Right, 10/26/2022). Social History:  reports that he has never smoked. He has never used smokeless tobacco. He reports current alcohol use. He reports that he does not use drugs. Family History: family history is not on file. Allergies: Patient has no known allergies.      Initial Plan of Care: Initiate Treatment-Testing, Proceed toTreatment Area When Bed Available for ED Attending/MLP to Continue Care      ---END OF FIRST PROVIDER CONTACT ASSESSMENT NOTE---  Electronically signed by TRINI Martins   DD: 22

## 2022-11-02 NOTE — ED PROVIDER NOTES
Dianne Hurst 60 y.o. male PHMx of type 2 diabetes, primary osteoarthritis of the right hip, status post right hip arthroplasty presents to the ED c/o DVT in the right. He reports that he was sent over by his primary care physician and ultrasound showed a DVT in the right peroneal vein. He is not on anticoagulations. He reports that he is agreeable to starting oral anticoagulants for this. . Onset: Several days. Location/Radiation: Right. Duration: Swelling has been persistent for the past several days and now is getting better. Characterization: Swelling, denies any acute pain. Aggravating Factors: status post surgery. Relieving Factors: Rest.  Severity: Moderate. Assx Sxs: Leg swelling, DVT. He Denies: Fever/chills, abdominal pain, hemoptysis, cough, shortness of breath, chest pain/palpitations. Review of Systems   Constitutional:  Negative for activity change, appetite change, chills and fever. HENT:  Negative for congestion and drooling. Eyes:  Negative for discharge and itching. Respiratory:  Negative for apnea and chest tightness. Cardiovascular:  Positive for leg swelling. Negative for chest pain and palpitations. Gastrointestinal:  Negative for abdominal distention and abdominal pain. Endocrine: Negative for cold intolerance and heat intolerance. Genitourinary:  Negative for dysuria and flank pain. Musculoskeletal:  Negative for arthralgias and back pain. Allergic/Immunologic: Negative for environmental allergies and food allergies. Neurological:  Negative for dizziness and facial asymmetry. Hematological:  Negative for adenopathy. Does not bruise/bleed easily. Psychiatric/Behavioral:  Negative for agitation and behavioral problems. Physical Exam  Constitutional:       General: He is not in acute distress. Appearance: He is obese. He is not ill-appearing, toxic-appearing or diaphoretic. HENT:      Head: Normocephalic and atraumatic.       Right Ear: acceptable limits. Formal ultrasound confirmed DVT in the right peroneal vein. Patient was given his first dose of Eliquis in the ER. He was then prescribed Eliquis DVT starter pack. Patient medically clear for discharge home. He is nonhypoxic on room air, normal heart rate, normal blood pressure. He has close follow-up with his primary care physician. Return precautions ER given. Patient and his sister's questions concerns answered. Patient was stable at time of discharge.                   --------------------------------------------- PAST HISTORY ---------------------------------------------  Past Medical History:  has a past medical history of Abdominal hernia, Arthritis, Bilateral knee pain, Diabetes mellitus ([address] Diverticulitis, Hyperlipidemia, OKSANA on CPAP, and Right hip pain. Past Surgical History:  has a past surgical history that includes Knee arthroscopy; hernia repair; Abdomen surgery (1990'S); Abdomen surgery (1990'S); tumor excision (CHILD); myringotomy (03/15/2012); Cataract extraction, extracapsular w/ intraocular lens implant (Bilateral); Colonoscopy; and Total hip arthroplasty (Right, 10/26/2022). Social History:  reports that he has never smoked. He has never used smokeless tobacco. He reports current alcohol use. He reports that he does not use drugs. Family History: family history is not on file. The patients home medications have been reviewed. Allergies: Patient has no known allergies.     -------------------------------------------------- RESULTS -------------------------------------------------  Labs:  Results for orders placed or performed during the hospital encounter of 11/02/22   CBC with Auto Differential   Result Value Ref Range    WBC 15.1 (H) 4.5 - 11.5 E9/L    RBC 4.84 3.80 - 5.80 E12/L    Hemoglobin 14.4 12.5 - 16.5 g/dL    Hematocrit 44.0 37.0 - 54.0 %    MCV 90.9 80.0 - 99.9 fL    MCH 29.8 26.0 - 35.0 pg    MCHC 32.7 32.0 - 34.5 %    RDW 12.8 11.5 - 15.0 fL    Platelets 624 327 - 788 E9/L    MPV 8.5 7.0 - 12.0 fL    Neutrophils % 74.9 43.0 - 80.0 %    Immature Granulocytes % 0.7 0.0 - 5.0 %    Lymphocytes % 14.1 (L) 20.0 - 42.0 %    Monocytes % 7.3 2.0 - 12.0 %    Eosinophils % 2.0 0.0 - 6.0 %    Basophils % 1.0 0.0 - 2.0 %    Neutrophils Absolute 11.33 (H) 1.80 - 7.30 E9/L    Immature Granulocytes # 0.10 E9/L    Lymphocytes Absolute 2.14 1.50 - 4.00 E9/L    Monocytes Absolute 1.11 (H) 0.10 - 0.95 E9/L    Eosinophils Absolute 0.31 0.05 - 0.50 E9/L    Basophils Absolute 0.15 0.00 - 0.20 E9/L   Comprehensive Metabolic Panel   Result Value Ref Range    Sodium 139 132 - 146 mmol/L    Potassium 4.0 3.5 - 5.0 mmol/L    Chloride 100 98 - 107 mmol/L    CO2 27 22 - 29 mmol/L    Anion Gap 12 7 - 16 mmol/L    Glucose 122 (H) 74 - 99 mg/dL    BUN 11 6 - 23 mg/dL    Creatinine 0.8 0.7 - 1.2 mg/dL    Est, Glom Filt Rate >60 >=60 mL/min/1.73    Calcium 9.5 8.6 - 10.2 mg/dL    Total Protein 6.6 6.4 - 8.3 g/dL    Albumin 3.6 3.5 - 5.2 g/dL    Total Bilirubin 0.6 0.0 - 1.2 mg/dL    Alkaline Phosphatase 41 40 - 129 U/L    ALT 31 0 - 40 U/L    AST 28 0 - 39 U/L   Protime-INR   Result Value Ref Range    Protime 12.4 9.3 - 12.4 sec    INR 1.1    APTT   Result Value Ref Range    aPTT 29.1 24.5 - 35.1 sec       Radiology:  No orders to display       ------------------------- NURSING NOTES AND VITALS REVIEWED ---------------------------  Date / Time Roomed:  11/2/2022  4:46 PM  ED Bed Assignment:  [id number]    The nursing notes within the ED encounter and vital signs as below have been reviewed.    BP (!) 142/86   Pulse 86   Temp 97.3 °F (36.3 °C) (Oral)   Resp 18   Ht 5' 7\" (1.702 m)   Wt 263 lb (119.3 kg)   SpO2 94%   BMI 41.19 kg/m²   Oxygen Saturation Interpretation: Normal      ------------------------------------------ PROGRESS NOTES ------------------------------------------  12:37 PM EDT  I have spoken with the patient and discussed todays results, in addition to providing specific details for the plan of care and counseling regarding the diagnosis and prognosis. Their questions are answered at this time and they are agreeable with the plan. I discussed at length with them reasons for immediate return here for re evaluation. They will followup with their primary care physician by calling their office tomorrow. --------------------------------- ADDITIONAL PROVIDER NOTES ---------------------------------  At this time the patient is without objective evidence of an acute process requiring hospitalization or inpatient management. They have remained hemodynamically stable throughout their entire ED visit and are stable for discharge with outpatient follow-up. The plan has been discussed in detail and they are aware of the specific conditions for emergent return, as well as the importance of follow-up. Discharge Medication List as of 11/2/2022  5:52 PM        START taking these medications    Details   apixaban starter pack (ELIQUIS DVT/PE STARTER PACK) 5 MG TBPK tablet Take 1 tablet by mouth See Admin Instructions, Disp-74 tablet, R-0Print             Diagnosis:  1. Acute deep vein thrombosis (DVT) of right lower extremity, unspecified vein (HCC)        Disposition:  Patient's disposition: Discharge to home  Patient's condition is stable.       Taryn Zabala,   Resident  11/03/22 0605

## 2022-11-03 NOTE — PROGRESS NOTES
CLINICAL PHARMACY NOTE: MEDS TO BEDS    Total # of Prescriptions Filled: 1   The following medications were delivered to the patient:  Eliquis starter pack    Additional Documentation:

## 2022-11-10 ENCOUNTER — APPOINTMENT (OUTPATIENT)
Dept: ULTRASOUND IMAGING | Age: 62
DRG: 377 | End: 2022-11-10
Payer: COMMERCIAL

## 2022-11-10 ENCOUNTER — ANESTHESIA EVENT (OUTPATIENT)
Dept: ENDOSCOPY | Age: 62
DRG: 377 | End: 2022-11-10
Payer: COMMERCIAL

## 2022-11-10 ENCOUNTER — APPOINTMENT (OUTPATIENT)
Dept: GENERAL RADIOLOGY | Age: 62
DRG: 377 | End: 2022-11-10
Payer: COMMERCIAL

## 2022-11-10 ENCOUNTER — HOSPITAL ENCOUNTER (INPATIENT)
Age: 62
LOS: 1 days | Discharge: ANOTHER HOSPITAL | DRG: 377 | End: 2022-11-11
Attending: EMERGENCY MEDICINE | Admitting: FAMILY MEDICINE
Payer: COMMERCIAL

## 2022-11-10 ENCOUNTER — APPOINTMENT (OUTPATIENT)
Dept: CT IMAGING | Age: 62
DRG: 377 | End: 2022-11-10
Payer: COMMERCIAL

## 2022-11-10 ENCOUNTER — ANESTHESIA (OUTPATIENT)
Dept: ENDOSCOPY | Age: 62
DRG: 377 | End: 2022-11-10
Payer: COMMERCIAL

## 2022-11-10 DIAGNOSIS — R57.8 HEMORRHAGIC SHOCK (HCC): Primary | ICD-10-CM

## 2022-11-10 DIAGNOSIS — E87.20 LACTIC ACIDOSIS: ICD-10-CM

## 2022-11-10 PROBLEM — K92.2 GI BLEED: Status: ACTIVE | Noted: 2022-11-10

## 2022-11-10 PROBLEM — R58 HYPOTENSION DUE TO BLOOD LOSS: Status: ACTIVE | Noted: 2022-11-10

## 2022-11-10 PROBLEM — D62 ACUTE BLOOD LOSS ANEMIA: Status: ACTIVE | Noted: 2022-11-10

## 2022-11-10 PROBLEM — I82.401 ACUTE DEEP VEIN THROMBOSIS (DVT) OF RIGHT LOWER EXTREMITY (HCC): Status: ACTIVE | Noted: 2022-11-10

## 2022-11-10 PROBLEM — I95.89 HYPOTENSION DUE TO BLOOD LOSS: Status: ACTIVE | Noted: 2022-11-10

## 2022-11-10 LAB
ABO/RH: NORMAL
ALBUMIN SERPL-MCNC: 3.1 G/DL (ref 3.5–5.2)
ALP BLD-CCNC: 35 U/L (ref 40–129)
ALT SERPL-CCNC: 19 U/L (ref 0–40)
ANION GAP SERPL CALCULATED.3IONS-SCNC: 12 MMOL/L (ref 7–16)
ANION GAP SERPL CALCULATED.3IONS-SCNC: 15 MMOL/L (ref 7–16)
ANION GAP SERPL CALCULATED.3IONS-SCNC: 23 MMOL/L (ref 7–16)
ANISOCYTOSIS: ABNORMAL
ANTIBODY SCREEN: NORMAL
AST SERPL-CCNC: 13 U/L (ref 0–39)
BASOPHILS ABSOLUTE: 0.07 E9/L (ref 0–0.2)
BASOPHILS ABSOLUTE: 0.16 E9/L (ref 0–0.2)
BASOPHILS RELATIVE PERCENT: 0.3 % (ref 0–2)
BASOPHILS RELATIVE PERCENT: 0.6 % (ref 0–2)
BETA-HYDROXYBUTYRATE: 0.46 MMOL/L (ref 0.02–0.27)
BILIRUB SERPL-MCNC: 0.2 MG/DL (ref 0–1.2)
BILIRUBIN URINE: NEGATIVE
BLOOD BANK DISPENSE STATUS: NORMAL
BLOOD BANK PRODUCT CODE: NORMAL
BLOOD, URINE: NEGATIVE
BPU ID: NORMAL
BUN BLDV-MCNC: 46 MG/DL (ref 6–23)
C-REACTIVE PROTEIN: 0.9 MG/DL (ref 0–0.4)
CALCIUM SERPL-MCNC: 8.4 MG/DL (ref 8.6–10.2)
CALCIUM SERPL-MCNC: 8.6 MG/DL (ref 8.6–10.2)
CALCIUM SERPL-MCNC: 9.2 MG/DL (ref 8.6–10.2)
CHLORIDE BLD-SCNC: 103 MMOL/L (ref 98–107)
CHLORIDE BLD-SCNC: 108 MMOL/L (ref 98–107)
CHLORIDE BLD-SCNC: 112 MMOL/L (ref 98–107)
CLARITY: CLEAR
CO2: 14 MMOL/L (ref 22–29)
CO2: 19 MMOL/L (ref 22–29)
CO2: 20 MMOL/L (ref 22–29)
COLOR: YELLOW
CREAT SERPL-MCNC: 0.9 MG/DL (ref 0.7–1.2)
CREAT SERPL-MCNC: 0.9 MG/DL (ref 0.7–1.2)
CREAT SERPL-MCNC: 1.2 MG/DL (ref 0.7–1.2)
DESCRIPTION BLOOD BANK: NORMAL
EKG ATRIAL RATE: 119 BPM
EKG P AXIS: 97 DEGREES
EKG P-R INTERVAL: 164 MS
EKG Q-T INTERVAL: 318 MS
EKG QRS DURATION: 70 MS
EKG QTC CALCULATION (BAZETT): 447 MS
EKG R AXIS: -39 DEGREES
EKG T AXIS: 36 DEGREES
EKG VENTRICULAR RATE: 119 BPM
EOSINOPHILS ABSOLUTE: 0 E9/L (ref 0.05–0.5)
EOSINOPHILS ABSOLUTE: 0.03 E9/L (ref 0.05–0.5)
EOSINOPHILS RELATIVE PERCENT: 0 % (ref 0–6)
EOSINOPHILS RELATIVE PERCENT: 0.1 % (ref 0–6)
GFR SERPL CREATININE-BSD FRML MDRD: >60 ML/MIN/1.73
GLUCOSE BLD-MCNC: 213 MG/DL (ref 74–99)
GLUCOSE BLD-MCNC: 227 MG/DL (ref 74–99)
GLUCOSE BLD-MCNC: 313 MG/DL (ref 74–99)
GLUCOSE URINE: >=1000 MG/DL
HCT VFR BLD CALC: 18.6 % (ref 37–54)
HCT VFR BLD CALC: 24.5 % (ref 37–54)
HCT VFR BLD CALC: 26 % (ref 37–54)
HCT VFR BLD CALC: 26.7 % (ref 37–54)
HEMOGLOBIN: 5.9 G/DL (ref 12.5–16.5)
HEMOGLOBIN: 8 G/DL (ref 12.5–16.5)
HEMOGLOBIN: 8.3 G/DL (ref 12.5–16.5)
HEMOGLOBIN: 8.5 G/DL (ref 12.5–16.5)
IMMATURE GRANULOCYTES #: 0.37 E9/L
IMMATURE GRANULOCYTES #: 0.56 E9/L
IMMATURE GRANULOCYTES %: 1.7 % (ref 0–5)
IMMATURE GRANULOCYTES %: 2 % (ref 0–5)
INR BLD: 1.3
INR BLD: 1.9
KETONES, URINE: ABNORMAL MG/DL
LACTIC ACID, SEPSIS: 6.7 MMOL/L (ref 0.5–1.9)
LACTIC ACID: 5 MMOL/L (ref 0.5–2.2)
LEUKOCYTE ESTERASE, URINE: NEGATIVE
LIPASE: 29 U/L (ref 13–60)
LYMPHOCYTES ABSOLUTE: 1.95 E9/L (ref 1.5–4)
LYMPHOCYTES ABSOLUTE: 2.25 E9/L (ref 1.5–4)
LYMPHOCYTES RELATIVE PERCENT: 8.1 % (ref 20–42)
LYMPHOCYTES RELATIVE PERCENT: 9 % (ref 20–42)
MAGNESIUM: 1.8 MG/DL (ref 1.6–2.6)
MCH RBC QN AUTO: 29.6 PG (ref 26–35)
MCH RBC QN AUTO: 30.5 PG (ref 26–35)
MCH RBC QN AUTO: 31.7 PG (ref 26–35)
MCHC RBC AUTO-ENTMCNC: 31.1 % (ref 32–34.5)
MCHC RBC AUTO-ENTMCNC: 32.7 % (ref 32–34.5)
MCHC RBC AUTO-ENTMCNC: 32.7 % (ref 32–34.5)
MCV RBC AUTO: 90.7 FL (ref 80–99.9)
MCV RBC AUTO: 97 FL (ref 80–99.9)
MCV RBC AUTO: 98.2 FL (ref 80–99.9)
METER GLUCOSE: 259 MG/DL (ref 74–99)
METER GLUCOSE: 270 MG/DL (ref 74–99)
MONOCYTES ABSOLUTE: 0.72 E9/L (ref 0.1–0.95)
MONOCYTES ABSOLUTE: 0.72 E9/L (ref 0.1–0.95)
MONOCYTES RELATIVE PERCENT: 2.6 % (ref 2–12)
MONOCYTES RELATIVE PERCENT: 3.3 % (ref 2–12)
NEUTROPHILS ABSOLUTE: 18.57 E9/L (ref 1.8–7.3)
NEUTROPHILS ABSOLUTE: 24.04 E9/L (ref 1.8–7.3)
NEUTROPHILS RELATIVE PERCENT: 85.7 % (ref 43–80)
NEUTROPHILS RELATIVE PERCENT: 86.6 % (ref 43–80)
NITRITE, URINE: NEGATIVE
PDW BLD-RTO: 13.5 FL (ref 11.5–15)
PDW BLD-RTO: 13.5 FL (ref 11.5–15)
PDW BLD-RTO: 15.5 FL (ref 11.5–15)
PH UA: 5.5 (ref 5–9)
PHOSPHORUS: 2.7 MG/DL (ref 2.5–4.5)
PLATELET # BLD: 305 E9/L (ref 130–450)
PLATELET # BLD: 317 E9/L (ref 130–450)
PLATELET # BLD: 419 E9/L (ref 130–450)
PMV BLD AUTO: 9.1 FL (ref 7–12)
PMV BLD AUTO: 9.2 FL (ref 7–12)
PMV BLD AUTO: 9.4 FL (ref 7–12)
POLYCHROMASIA: ABNORMAL
POTASSIUM SERPL-SCNC: 4.1 MMOL/L (ref 3.5–5)
POTASSIUM SERPL-SCNC: 4.2 MMOL/L (ref 3.5–5)
POTASSIUM SERPL-SCNC: 4.3 MMOL/L (ref 3.5–5)
PROCALCITONIN: 0.18 NG/ML (ref 0–0.08)
PROTEIN UA: NEGATIVE MG/DL
PROTHROMBIN TIME: 13.9 SEC (ref 9.3–12.4)
PROTHROMBIN TIME: 21 SEC (ref 9.3–12.4)
RBC # BLD: 2.68 E12/L (ref 3.8–5.8)
RBC # BLD: 2.7 E12/L (ref 3.8–5.8)
RBC # BLD: 2.72 E12/L (ref 3.8–5.8)
SODIUM BLD-SCNC: 140 MMOL/L (ref 132–146)
SODIUM BLD-SCNC: 142 MMOL/L (ref 132–146)
SODIUM BLD-SCNC: 144 MMOL/L (ref 132–146)
SPECIFIC GRAVITY UA: 1.02 (ref 1–1.03)
TOTAL PROTEIN: 5.3 G/DL (ref 6.4–8.3)
TROPONIN, HIGH SENSITIVITY: 27 NG/L (ref 0–11)
TROPONIN, HIGH SENSITIVITY: 32 NG/L (ref 0–11)
UROBILINOGEN, URINE: 0.2 E.U./DL
WBC # BLD: 21.7 E9/L (ref 4.5–11.5)
WBC # BLD: 23 E9/L (ref 4.5–11.5)
WBC # BLD: 27.8 E9/L (ref 4.5–11.5)

## 2022-11-10 PROCEDURE — 2580000003 HC RX 258: Performed by: INTERNAL MEDICINE

## 2022-11-10 PROCEDURE — 6360000002 HC RX W HCPCS: Performed by: NURSE ANESTHETIST, CERTIFIED REGISTERED

## 2022-11-10 PROCEDURE — 85027 COMPLETE CBC AUTOMATED: CPT

## 2022-11-10 PROCEDURE — 0W3P8ZZ CONTROL BLEEDING IN GASTROINTESTINAL TRACT, VIA NATURAL OR ARTIFICIAL OPENING ENDOSCOPIC: ICD-10-PCS | Performed by: STUDENT IN AN ORGANIZED HEALTH CARE EDUCATION/TRAINING PROGRAM

## 2022-11-10 PROCEDURE — 86901 BLOOD TYPING SEROLOGIC RH(D): CPT

## 2022-11-10 PROCEDURE — 80053 COMPREHEN METABOLIC PANEL: CPT

## 2022-11-10 PROCEDURE — 84484 ASSAY OF TROPONIN QUANT: CPT

## 2022-11-10 PROCEDURE — 3700000001 HC ADD 15 MINUTES (ANESTHESIA): Performed by: STUDENT IN AN ORGANIZED HEALTH CARE EDUCATION/TRAINING PROGRAM

## 2022-11-10 PROCEDURE — 84100 ASSAY OF PHOSPHORUS: CPT

## 2022-11-10 PROCEDURE — A4216 STERILE WATER/SALINE, 10 ML: HCPCS | Performed by: INTERNAL MEDICINE

## 2022-11-10 PROCEDURE — C9113 INJ PANTOPRAZOLE SODIUM, VIA: HCPCS | Performed by: INTERNAL MEDICINE

## 2022-11-10 PROCEDURE — 2580000003 HC RX 258: Performed by: FAMILY MEDICINE

## 2022-11-10 PROCEDURE — 99285 EMERGENCY DEPT VISIT HI MDM: CPT

## 2022-11-10 PROCEDURE — 86900 BLOOD TYPING SEROLOGIC ABO: CPT

## 2022-11-10 PROCEDURE — 3700000000 HC ANESTHESIA ATTENDED CARE: Performed by: STUDENT IN AN ORGANIZED HEALTH CARE EDUCATION/TRAINING PROGRAM

## 2022-11-10 PROCEDURE — 02HV33Z INSERTION OF INFUSION DEVICE INTO SUPERIOR VENA CAVA, PERCUTANEOUS APPROACH: ICD-10-PCS | Performed by: EMERGENCY MEDICINE

## 2022-11-10 PROCEDURE — 83605 ASSAY OF LACTIC ACID: CPT

## 2022-11-10 PROCEDURE — 86140 C-REACTIVE PROTEIN: CPT

## 2022-11-10 PROCEDURE — 2500000003 HC RX 250 WO HCPCS: Performed by: FAMILY MEDICINE

## 2022-11-10 PROCEDURE — 71275 CT ANGIOGRAPHY CHEST: CPT

## 2022-11-10 PROCEDURE — 81003 URINALYSIS AUTO W/O SCOPE: CPT

## 2022-11-10 PROCEDURE — 6370000000 HC RX 637 (ALT 250 FOR IP): Performed by: INTERNAL MEDICINE

## 2022-11-10 PROCEDURE — 93970 EXTREMITY STUDY: CPT

## 2022-11-10 PROCEDURE — 86850 RBC ANTIBODY SCREEN: CPT

## 2022-11-10 PROCEDURE — 83690 ASSAY OF LIPASE: CPT

## 2022-11-10 PROCEDURE — 85014 HEMATOCRIT: CPT

## 2022-11-10 PROCEDURE — 6360000002 HC RX W HCPCS: Performed by: STUDENT IN AN ORGANIZED HEALTH CARE EDUCATION/TRAINING PROGRAM

## 2022-11-10 PROCEDURE — 85018 HEMOGLOBIN: CPT

## 2022-11-10 PROCEDURE — 96365 THER/PROPH/DIAG IV INF INIT: CPT

## 2022-11-10 PROCEDURE — A4216 STERILE WATER/SALINE, 10 ML: HCPCS | Performed by: EMERGENCY MEDICINE

## 2022-11-10 PROCEDURE — 6360000002 HC RX W HCPCS: Performed by: EMERGENCY MEDICINE

## 2022-11-10 PROCEDURE — 36556 INSERT NON-TUNNEL CV CATH: CPT

## 2022-11-10 PROCEDURE — 99221 1ST HOSP IP/OBS SF/LOW 40: CPT | Performed by: NURSE PRACTITIONER

## 2022-11-10 PROCEDURE — 87040 BLOOD CULTURE FOR BACTERIA: CPT

## 2022-11-10 PROCEDURE — 87088 URINE BACTERIA CULTURE: CPT

## 2022-11-10 PROCEDURE — C1052 HEMOSTATIC AGENT, GI, TOPIC: HCPCS | Performed by: STUDENT IN AN ORGANIZED HEALTH CARE EDUCATION/TRAINING PROGRAM

## 2022-11-10 PROCEDURE — 2580000003 HC RX 258: Performed by: EMERGENCY MEDICINE

## 2022-11-10 PROCEDURE — 71045 X-RAY EXAM CHEST 1 VIEW: CPT

## 2022-11-10 PROCEDURE — 36592 COLLECT BLOOD FROM PICC: CPT

## 2022-11-10 PROCEDURE — 84145 PROCALCITONIN (PCT): CPT

## 2022-11-10 PROCEDURE — 2580000003 HC RX 258: Performed by: NURSE ANESTHETIST, CERTIFIED REGISTERED

## 2022-11-10 PROCEDURE — 2000000000 HC ICU R&B

## 2022-11-10 PROCEDURE — 85610 PROTHROMBIN TIME: CPT

## 2022-11-10 PROCEDURE — 80048 BASIC METABOLIC PNL TOTAL CA: CPT

## 2022-11-10 PROCEDURE — 82010 KETONE BODYS QUAN: CPT

## 2022-11-10 PROCEDURE — 93005 ELECTROCARDIOGRAM TRACING: CPT | Performed by: EMERGENCY MEDICINE

## 2022-11-10 PROCEDURE — 6360000004 HC RX CONTRAST MEDICATION: Performed by: RADIOLOGY

## 2022-11-10 PROCEDURE — 82962 GLUCOSE BLOOD TEST: CPT

## 2022-11-10 PROCEDURE — 2720000010 HC SURG SUPPLY STERILE: Performed by: STUDENT IN AN ORGANIZED HEALTH CARE EDUCATION/TRAINING PROGRAM

## 2022-11-10 PROCEDURE — 36415 COLL VENOUS BLD VENIPUNCTURE: CPT

## 2022-11-10 PROCEDURE — 85025 COMPLETE CBC W/AUTO DIFF WBC: CPT

## 2022-11-10 PROCEDURE — 87150 DNA/RNA AMPLIFIED PROBE: CPT

## 2022-11-10 PROCEDURE — 96375 TX/PRO/DX INJ NEW DRUG ADDON: CPT

## 2022-11-10 PROCEDURE — 93010 ELECTROCARDIOGRAM REPORT: CPT | Performed by: INTERNAL MEDICINE

## 2022-11-10 PROCEDURE — 2709999900 HC NON-CHARGEABLE SUPPLY: Performed by: STUDENT IN AN ORGANIZED HEALTH CARE EDUCATION/TRAINING PROGRAM

## 2022-11-10 PROCEDURE — 86923 COMPATIBILITY TEST ELECTRIC: CPT

## 2022-11-10 PROCEDURE — P9016 RBC LEUKOCYTES REDUCED: HCPCS

## 2022-11-10 PROCEDURE — C9113 INJ PANTOPRAZOLE SODIUM, VIA: HCPCS | Performed by: EMERGENCY MEDICINE

## 2022-11-10 PROCEDURE — 36430 TRANSFUSION BLD/BLD COMPNT: CPT

## 2022-11-10 PROCEDURE — 3609013000 HC EGD TRANSORAL CONTROL BLEEDING ANY METHOD: Performed by: STUDENT IN AN ORGANIZED HEALTH CARE EDUCATION/TRAINING PROGRAM

## 2022-11-10 PROCEDURE — 87081 CULTURE SCREEN ONLY: CPT

## 2022-11-10 PROCEDURE — 2500000003 HC RX 250 WO HCPCS: Performed by: NURSE ANESTHETIST, CERTIFIED REGISTERED

## 2022-11-10 PROCEDURE — 83735 ASSAY OF MAGNESIUM: CPT

## 2022-11-10 PROCEDURE — 6360000002 HC RX W HCPCS: Performed by: INTERNAL MEDICINE

## 2022-11-10 RX ORDER — SODIUM CHLORIDE, SODIUM LACTATE, POTASSIUM CHLORIDE, AND CALCIUM CHLORIDE .6; .31; .03; .02 G/100ML; G/100ML; G/100ML; G/100ML
1000 INJECTION, SOLUTION INTRAVENOUS ONCE
Status: COMPLETED | OUTPATIENT
Start: 2022-11-10 | End: 2022-11-10

## 2022-11-10 RX ORDER — SODIUM CHLORIDE 9 MG/ML
50 INJECTION, SOLUTION INTRAVENOUS ONCE
Status: DISCONTINUED | OUTPATIENT
Start: 2022-11-10 | End: 2022-11-10 | Stop reason: DRUGHIGH

## 2022-11-10 RX ORDER — SODIUM CHLORIDE 9 MG/ML
INJECTION, SOLUTION INTRAVENOUS CONTINUOUS
Status: DISCONTINUED | OUTPATIENT
Start: 2022-11-10 | End: 2022-11-11 | Stop reason: HOSPADM

## 2022-11-10 RX ORDER — INSULIN LISPRO 100 [IU]/ML
0-4 INJECTION, SOLUTION INTRAVENOUS; SUBCUTANEOUS NIGHTLY
Status: DISCONTINUED | OUTPATIENT
Start: 2022-11-10 | End: 2022-11-11 | Stop reason: HOSPADM

## 2022-11-10 RX ORDER — INSULIN LISPRO 100 [IU]/ML
0-4 INJECTION, SOLUTION INTRAVENOUS; SUBCUTANEOUS
Status: DISCONTINUED | OUTPATIENT
Start: 2022-11-10 | End: 2022-11-11 | Stop reason: HOSPADM

## 2022-11-10 RX ORDER — 0.9 % SODIUM CHLORIDE 0.9 %
1000 INTRAVENOUS SOLUTION INTRAVENOUS ONCE
Status: COMPLETED | OUTPATIENT
Start: 2022-11-10 | End: 2022-11-10

## 2022-11-10 RX ORDER — 0.9 % SODIUM CHLORIDE 0.9 %
1000 INTRAVENOUS SOLUTION INTRAVENOUS ONCE
Status: DISCONTINUED | OUTPATIENT
Start: 2022-11-10 | End: 2022-11-10

## 2022-11-10 RX ORDER — ATORVASTATIN CALCIUM 10 MG/1
10 TABLET, FILM COATED ORAL DAILY
Status: DISCONTINUED | OUTPATIENT
Start: 2022-11-10 | End: 2022-11-11 | Stop reason: HOSPADM

## 2022-11-10 RX ORDER — PROPOFOL 10 MG/ML
INJECTION, EMULSION INTRAVENOUS PRN
Status: DISCONTINUED | OUTPATIENT
Start: 2022-11-10 | End: 2022-11-10 | Stop reason: SDUPTHER

## 2022-11-10 RX ORDER — SODIUM CHLORIDE 9 MG/ML
50 INJECTION, SOLUTION INTRAVENOUS ONCE
Status: DISCONTINUED | OUTPATIENT
Start: 2022-11-10 | End: 2022-11-10

## 2022-11-10 RX ORDER — SODIUM CHLORIDE 9 MG/ML
INJECTION, SOLUTION INTRAVENOUS PRN
Status: DISCONTINUED | OUTPATIENT
Start: 2022-11-10 | End: 2022-11-11 | Stop reason: HOSPADM

## 2022-11-10 RX ORDER — POLYETHYLENE GLYCOL 3350 17 G/17G
17 POWDER, FOR SOLUTION ORAL DAILY PRN
COMMUNITY

## 2022-11-10 RX ORDER — EPINEPHRINE 1 MG/ML(1)
AMPUL (ML) INJECTION PRN
Status: DISCONTINUED | OUTPATIENT
Start: 2022-11-10 | End: 2022-11-10 | Stop reason: ALTCHOICE

## 2022-11-10 RX ORDER — PHENYLEPHRINE HCL IN 0.9% NACL 1 MG/10 ML
SYRINGE (ML) INTRAVENOUS PRN
Status: DISCONTINUED | OUTPATIENT
Start: 2022-11-10 | End: 2022-11-10 | Stop reason: SDUPTHER

## 2022-11-10 RX ORDER — SODIUM CHLORIDE 9 MG/ML
INJECTION, SOLUTION INTRAVENOUS CONTINUOUS PRN
Status: DISCONTINUED | OUTPATIENT
Start: 2022-11-10 | End: 2022-11-10 | Stop reason: SDUPTHER

## 2022-11-10 RX ORDER — 0.9 % SODIUM CHLORIDE 0.9 %
2000 INTRAVENOUS SOLUTION INTRAVENOUS ONCE
Status: COMPLETED | OUTPATIENT
Start: 2022-11-10 | End: 2022-11-10

## 2022-11-10 RX ORDER — EPINEPHRINE 1 MG/ML
INJECTION, SOLUTION, CONCENTRATE INTRAVENOUS
Status: DISCONTINUED
Start: 2022-11-10 | End: 2022-11-10 | Stop reason: WASHOUT

## 2022-11-10 RX ORDER — DEXTROSE MONOHYDRATE 100 MG/ML
INJECTION, SOLUTION INTRAVENOUS CONTINUOUS PRN
Status: DISCONTINUED | OUTPATIENT
Start: 2022-11-10 | End: 2022-11-11 | Stop reason: HOSPADM

## 2022-11-10 RX ADMIN — SODIUM CHLORIDE: 9 INJECTION, SOLUTION INTRAVENOUS at 14:50

## 2022-11-10 RX ADMIN — PROPOFOL 10 MG: 10 INJECTION, EMULSION INTRAVENOUS at 15:30

## 2022-11-10 RX ADMIN — SODIUM CHLORIDE 2000 ML: 9 INJECTION, SOLUTION INTRAVENOUS at 04:00

## 2022-11-10 RX ADMIN — PROPOFOL 10 MG: 10 INJECTION, EMULSION INTRAVENOUS at 15:36

## 2022-11-10 RX ADMIN — SODIUM CHLORIDE 80 MG: 9 INJECTION, SOLUTION INTRAMUSCULAR; INTRAVENOUS; SUBCUTANEOUS at 05:09

## 2022-11-10 RX ADMIN — Medication 100 MCG: at 15:25

## 2022-11-10 RX ADMIN — NOREPINEPHRINE BITARTRATE 2 MCG/MIN: 1 INJECTION, SOLUTION, CONCENTRATE INTRAVENOUS at 07:29

## 2022-11-10 RX ADMIN — PROPOFOL 10 MG: 10 INJECTION, EMULSION INTRAVENOUS at 15:41

## 2022-11-10 RX ADMIN — SODIUM CHLORIDE 40 MG: 9 INJECTION, SOLUTION INTRAMUSCULAR; INTRAVENOUS; SUBCUTANEOUS at 20:58

## 2022-11-10 RX ADMIN — Medication 100 MCG: at 15:30

## 2022-11-10 RX ADMIN — SODIUM CHLORIDE, POTASSIUM CHLORIDE, SODIUM LACTATE AND CALCIUM CHLORIDE 1000 ML: 600; 310; 30; 20 INJECTION, SOLUTION INTRAVENOUS at 06:12

## 2022-11-10 RX ADMIN — PROTHROMBIN, COAGULATION FACTOR VII HUMAN, COAGULATION FACTOR IX HUMAN, COAGULATION FACTOR X HUMAN, PROTEIN C, PROTEIN S HUMAN, AND WATER 5000 UNITS: KIT at 05:02

## 2022-11-10 RX ADMIN — Medication 100 MCG: at 15:20

## 2022-11-10 RX ADMIN — PROPOFOL 20 MG: 10 INJECTION, EMULSION INTRAVENOUS at 15:24

## 2022-11-10 RX ADMIN — SODIUM CHLORIDE 1000 ML: 9 INJECTION, SOLUTION INTRAVENOUS at 02:24

## 2022-11-10 RX ADMIN — INSULIN LISPRO 2 UNITS: 100 INJECTION, SOLUTION INTRAVENOUS; SUBCUTANEOUS at 18:28

## 2022-11-10 RX ADMIN — PROPOFOL 50 MG: 10 INJECTION, EMULSION INTRAVENOUS at 15:15

## 2022-11-10 RX ADMIN — IOPAMIDOL 75 ML: 755 INJECTION, SOLUTION INTRAVENOUS at 11:43

## 2022-11-10 RX ADMIN — SODIUM CHLORIDE 1000 ML: 9 INJECTION, SOLUTION INTRAVENOUS at 11:31

## 2022-11-10 RX ADMIN — SODIUM CHLORIDE: 9 INJECTION, SOLUTION INTRAVENOUS at 12:58

## 2022-11-10 RX ADMIN — PROPOFOL 10 MG: 10 INJECTION, EMULSION INTRAVENOUS at 15:26

## 2022-11-10 RX ADMIN — Medication 100 MCG: at 15:42

## 2022-11-10 ASSESSMENT — PAIN SCALES - GENERAL
PAINLEVEL_OUTOF10: 0

## 2022-11-10 ASSESSMENT — PAIN - FUNCTIONAL ASSESSMENT
PAIN_FUNCTIONAL_ASSESSMENT: NONE - DENIES PAIN
PAIN_FUNCTIONAL_ASSESSMENT: NONE - DENIES PAIN

## 2022-11-10 NOTE — ED PROVIDER NOTES
HPI:  11/10/22,   Time: 2:20 AM FAUSTO Barron. is a 60 y.o. male presenting to the ED for dizziness and lightheadedness, beginning 1 day ago. The complaint has been persistent, moderate in severity, and worsened by nothing. The patient states that he recently was seen in the emergency department and started on Eliquis for a DVT. He states that tonight he was having a bowel movement for the second time and he noticed that it was dark and black in nature. He states that during his second bowel movement he felt lightheaded and dizzy and felt as if he may pass out therefore he called EMS. No nausea vomiting. No chest pain or shortness of breath. No headaches. Review of Systems:   Pertinent positives and negatives are stated within HPI, all other systems reviewed and are negative.          --------------------------------------------- PAST HISTORY ---------------------------------------------  Past Medical History:  has a past medical history of Abdominal hernia, Arthritis, Bilateral knee pain, Diabetes mellitus ([address] Diverticulitis, Hyperlipidemia, OKSANA on CPAP, and Right hip pain. Past Surgical History:  has a past surgical history that includes Knee arthroscopy; hernia repair; Abdomen surgery (1990'S); Abdomen surgery (1990'S); tumor excision (CHILD); myringotomy (03/15/2012); Cataract extraction, extracapsular w/ intraocular lens implant (Bilateral); Colonoscopy; and Total hip arthroplasty (Right, 10/26/2022). Social History:  reports that he has never smoked. He has never used smokeless tobacco. He reports current alcohol use. He reports that he does not use drugs. Family History: family history is not on file. The patients home medications have been reviewed. Allergies: Patient has no known allergies.         ---------------------------------------------------PHYSICAL EXAM--------------------------------------    Constitutional/General: Alert and oriented x3, well appearing, non toxic in NAD  Head: Normocephalic and atraumatic  Eyes: PERRL, EOMI, conjunctive normal, sclera non icteric  Mouth: Oropharynx clear, handling secretions, no trismus, no asymmetry of the posterior oropharynx or uvular edema  Neck: Supple, full ROM, non tender to palpation in the midline, no stridor, no crepitus, no meningeal signs  Respiratory: Lungs clear to auscultation bilaterally, no wheezes, rales, or rhonchi. Not in respiratory distress  Cardiovascular:  Regular rate. Regular rhythm. No murmurs, gallops, or rubs. 2+ distal pulses  GI:  Abdomen Soft, Non tender, Non distended. +BS. Hernias noted above reducible and chronic in nature no pain no rebound, guarding, or rigidity. Musculoskeletal: Moves all extremities x 4. Warm and well perfused, no clubbing, cyanosis, or edema. Capillary refill <3 seconds  Integument: skin warm and dry. No rashes. Neurologic: GCS 15, no focal deficits, symmetric strength 5/5 in the upper and lower extremities bilaterally  Psychiatric: Normal Affect    -------------------------------------------------- RESULTS -------------------------------------------------  I have personally reviewed all laboratory and imaging results for this patient. Results are listed below.      LABS:  Results for orders placed or performed during the hospital encounter of 11/10/22   CBC with Auto Differential   Result Value Ref Range    WBC 27.8 (H) 4.5 - 11.5 E9/L    RBC 2.72 (L) 3.80 - 5.80 E12/L    Hemoglobin 8.3 (L) 12.5 - 16.5 g/dL    Hematocrit 26.7 (L) 37.0 - 54.0 %    MCV 98.2 80.0 - 99.9 fL    MCH 30.5 26.0 - 35.0 pg    MCHC 31.1 (L) 32.0 - 34.5 %    RDW 13.5 11.5 - 15.0 fL    Platelets 123 159 - 039 E9/L    MPV 9.1 7.0 - 12.0 fL    Neutrophils % 86.6 (H) 43.0 - 80.0 %    Immature Granulocytes % 2.0 0.0 - 5.0 %    Lymphocytes % 8.1 (L) 20.0 - 42.0 %    Monocytes % 2.6 2.0 - 12.0 %    Eosinophils % 0.1 0.0 - 6.0 %    Basophils % 0.6 0.0 - 2.0 %    Neutrophils Absolute 24.04 (H) 1.80 - 7.30 E9/L Immature Granulocytes # 0.56 E9/L    Lymphocytes Absolute 2.25 1.50 - 4.00 E9/L    Monocytes Absolute 0.72 0.10 - 0.95 E9/L    Eosinophils Absolute 0.03 (L) 0.05 - 0.50 E9/L    Basophils Absolute 0.16 0.00 - 0.20 E9/L    Anisocytosis 1+     Polychromasia 1+    CMP   Result Value Ref Range    Sodium 140 132 - 146 mmol/L    Potassium 4.1 3.5 - 5.0 mmol/L    Chloride 103 98 - 107 mmol/L    CO2 14 (L) 22 - 29 mmol/L    Anion Gap 23 (H) 7 - 16 mmol/L    Glucose 313 (H) 74 - 99 mg/dL    BUN 46 (H) 6 - 23 mg/dL    Creatinine 1.2 0.7 - 1.2 mg/dL    Est, Glom Filt Rate >60 >=60 mL/min/1.73    Calcium 9.2 8.6 - 10.2 mg/dL    Total Protein 5.3 (L) 6.4 - 8.3 g/dL    Albumin 3.1 (L) 3.5 - 5.2 g/dL    Total Bilirubin 0.2 0.0 - 1.2 mg/dL    Alkaline Phosphatase 35 (L) 40 - 129 U/L    ALT 19 0 - 40 U/L    AST 13 0 - 39 U/L   Lipase   Result Value Ref Range    Lipase 29 13 - 60 U/L   Urinalysis   Result Value Ref Range    Color, UA Yellow Straw/Yellow    Clarity, UA Clear Clear    Glucose, Ur >=1000 (A) Negative mg/dL    Bilirubin Urine Negative Negative    Ketones, Urine TRACE (A) Negative mg/dL    Specific Gravity, UA 1.020 1.005 - 1.030    Blood, Urine Negative Negative    pH, UA 5.5 5.0 - 9.0    Protein, UA Negative Negative mg/dL    Urobilinogen, Urine 0.2 <2.0 E.U./dL    Nitrite, Urine Negative Negative    Leukocyte Esterase, Urine Negative Negative   Protime-INR   Result Value Ref Range    Protime 21.0 (H) 9.3 - 12.4 sec    INR 1.9    Troponin   Result Value Ref Range    Troponin, High Sensitivity 32 (H) 0 - 11 ng/L   Troponin   Result Value Ref Range    Troponin, High Sensitivity 27 (H) 0 - 11 ng/L   Lactate, Sepsis   Result Value Ref Range    Lactic Acid, Sepsis 6.7 (HH) 0.5 - 1.9 mmol/L   Beta-Hydroxybutyrate   Result Value Ref Range    Beta-Hydroxybutyrate 0.46 (H) 0.02 - 0.27 mmol/L   CBC   Result Value Ref Range    WBC 23.0 (H) 4.5 - 11.5 E9/L    RBC 2.68 (L) 3.80 - 5.80 E12/L    Hemoglobin 8.5 (L) 12.5 - 16.5 g/dL Hematocrit 26.0 (L) 37.0 - 54.0 %    MCV 97.0 80.0 - 99.9 fL    MCH 31.7 26.0 - 35.0 pg    MCHC 32.7 32.0 - 34.5 %    RDW 13.5 11.5 - 15.0 fL    Platelets 501 321 - 379 E9/L    MPV 9.2 7.0 - 12.0 fL   Basic Metabolic Panel   Result Value Ref Range    Sodium 142 132 - 146 mmol/L    Potassium 4.3 3.5 - 5.0 mmol/L    Chloride 108 (H) 98 - 107 mmol/L    CO2 19 (L) 22 - 29 mmol/L    Anion Gap 15 7 - 16 mmol/L    Glucose 213 (H) 74 - 99 mg/dL    BUN 46 (H) 6 - 23 mg/dL    Creatinine 0.9 0.7 - 1.2 mg/dL    Est, Glom Filt Rate >60 >=60 mL/min/1.73    Calcium 8.6 8.6 - 10.2 mg/dL   EKG 12 Lead   Result Value Ref Range    Ventricular Rate 119 BPM    Atrial Rate 119 BPM    P-R Interval 164 ms    QRS Duration 70 ms    Q-T Interval 318 ms    QTc Calculation (Bazett) 447 ms    P Axis 97 degrees    R Axis -39 degrees    T Axis 36 degrees   TYPE AND SCREEN   Result Value Ref Range    ABO/Rh A POS     Antibody Screen NEG    PREPARE RBC (CROSSMATCH)   Result Value Ref Range    Product Code Blood Bank I1413O11     Description Blood Bank Red Blood Cells, Leuko-reduced     Unit Number [id number]     Dispense Status Blood Bank issued     Product Code Blood Bank C3056C04     Description Blood Bank Red Blood Cells, Leuko-reduced     Unit Number [id number]     Dispense Status Blood Bank issued        RADIOLOGY:  Interpreted by Radiologist.  XR CHEST PORTABLE    (Results Pending)       EKG: This EKG is signed and interpreted by the EP. EKG shows sinus tachycardia 119 bpm.  Left axis deviation. Low voltage. Nonspecific ST-T wave changes. No STEMI.    ------------------------- NURSING NOTES AND VITALS REVIEWED ---------------------------   The nursing notes within the ED encounter and vital signs as below have been reviewed by myself.   BP (!) 98/56   Pulse (!) 122   Temp 98.6 °F (37 °C)   Resp 18   Wt 255 lb (115.7 kg)   SpO2 100%   BMI 39.94 kg/m²   Oxygen Saturation Interpretation: Normal    The patients available past medical records and past encounters were reviewed. ------------------------------ ED COURSE/MEDICAL DECISION MAKING----------------------  Medications   0.9 % sodium chloride infusion (has no administration in time range)   prothrombin complex concentrate (human) (KCENTRA) infusion 5,000 Units (5,000 Units IntraVENous New Bag 11/10/22 0502)     Followed by   0.9 % sodium chloride infusion (has no administration in time range)   0.9 % sodium chloride infusion (has no administration in time range)   lactated ringers bolus (1,000 mLs IntraVENous New Bag 11/10/22 0612)   norepinephrine (LEVOPHED) 16 mg in dextrose 5 % 250 mL infusion (has no administration in time range)   piperacillin-tazobactam (ZOSYN) 4,500 mg in dextrose 5 % 100 mL IVPB (vial-mate) (has no administration in time range)   0.9 % sodium chloride bolus (1,000 mLs IntraVENous New Bag 11/10/22 0224)   pantoprazole (PROTONIX) 80 mg in sodium chloride (PF) 0.9 % 20 mL injection (80 mg IntraVENous Given 11/10/22 0509)   0.9 % sodium chloride bolus (2,000 mLs IntraVENous New Bag 11/10/22 0400)     PROCEDURE  11/10/22           CENTRAL LINE INSERTION  Risks, benefits and alternatives (for applicable procedures below) described. Performed By: EM Attending Physician. Indication: severe bleeding. Informed consent: Verbal consent obtained. The patient was counseled regarding the procedure in person, it's indications, risks, potential complications and alternatives and any questions were answered. Verbal consent was obtained. Procedure: After routine sterile preparation, local anesthesia obtained by infiltration using 1% Lidocaine without epinephrine. A left 3-Lumen 7F Central Venous Catheter was placed by femoral vein approach and secured by standard fashion. Ultrasound Guidance:   used. Number of Attempts: 1  Post-procedure Findings: A post procedural chest x-ray  was not indicated. Patient tolerated the procedure well.         ED COURSE: Medical Decision Making: This is a 68-year-old male presented to the ED for dizziness and dark stools. Upon arrival to the ED patient was hypotensive and tachycardic. Patient was noted to have melena with a suspected GI bleed. Patient underwent laboratory work-up was initially given IV fluids as we were waiting on blood and then transfused 2 units of PRBCs. Patient started on Protonix and Eliquis reversed. Patient did have improvement but blood pressure remains soft. Central line was placed. Patient's laboratory work-up did show a leukocytosis of 27.8. Hemoglobin was 8.3 down from 14.5. Patient's chemistry showed a bicarb of 14 anion gap 23 glucose of 313 with a BUN of 46 with a lactic acid of 6.7. Lipase normal.  Urinalysis shows no signs of infection. INR was 1.9. Beta hydroxybutyrate was 0.46. Repeat labs did show an improvement in the patient's white count of 23.0. Hemoglobin was stable at 8.5. Anion gap closed at 15 bicarb improved to 19. No signs of DKA. Due to the patient's GI bleed General surgery was consulted and they will evaluate the patient in the hospital.  Case discussed with Dr. Mark Quick who is accept the patient for admission as well as Dr. Vance Chinchilla who is excepted the patient to the ICU. Please note that the withdrawal or failure to initiate urgent interventions for this patient would likely result in a life threatening deterioration or permanent disability. Accordingly this patient received 46 minutes of critical care time, excluding separately billable procedures. I, Dr. Roxi Hong, am the primary provider for this encounter    This patient's ED course included: a personal history and physicial examination, re-evaluation prior to disposition, multiple bedside re-evaluations, IV medications, cardiac monitoring, and continuous pulse oximetry          Re-Evaluations:             Re-evaluation. Patients symptoms are improving      Counseling:    The emergency provider has spoken with the patient and discussed todays results, in addition to providing specific details for the plan of care and counseling regarding the diagnosis and prognosis. Questions are answered at this time and they are agreeable with the plan.       --------------------------------- IMPRESSION AND DISPOSITION ---------------------------------    IMPRESSION  1. Hemorrhagic shock ([address]    2. Lactic acidosis        DISPOSITION  Disposition: Admit to CCU/ICU  Patient condition is critical    NOTE: This report was transcribed using voice recognition software.  Every effort was made to ensure accuracy; however, inadvertent computerized transcription errors may be present          Jie Moffett DO  11/10/22 0784

## 2022-11-10 NOTE — PROGRESS NOTES
Database initiated pharmacy and medications verified with the patient. He is A&O from home with wife.  He had hip replaced 10-26-22 has been using a walker since then

## 2022-11-10 NOTE — ED NOTES
[facility] called stated patient to have EGD by Dr. Dmitry Ward today, unknown time. She will call ICU with information.       Desmond Zeng RN  11/10/22 0800

## 2022-11-10 NOTE — ANESTHESIA PRE PROCEDURE
Department of Anesthesiology  Preprocedure Note       Name:  Adelita Lombard. Age:  60 y.o.  :  1960                                          MRN:  [id number]         Date:  11/10/2022      Surgeon: Amado Lopez):  Parviz Patiño MD    Procedure: EGD ESOPHAGOGASTRODUODENOSCOPY    Medications prior to admission:   Prior to Admission medications    Medication Sig Start Date End Date Taking? Authorizing Provider   polyethylene glycol (MIRALAX) 17 g packet Take 17 g by mouth daily as needed for Constipation   Yes Historical Provider, MD   apixaban starter pack (ELIQUIS DVT/PE STARTER PACK) 5 MG TBPK tablet Take 1 tablet by mouth See Admin Instructions 22   Elena Weinstein DO   dapagliflozin (FARXIGA) 5 MG tablet Take 5 mg by mouth every morning    Historical Provider, MD   metFORMIN (GLUCOPHAGE) 1000 MG tablet Take 1,000 mg by mouth 2 times daily (with meals)    Historical Provider, MD   atorvastatin (LIPITOR) 10 MG tablet Take 10 mg by mouth daily    Historical Provider, MD   etodolac (LODINE XL) 400 MG SR tablet Take 400 mg by mouth daily as needed.     Historical Provider, MD       Current medications:    Current Facility-Administered Medications   Medication Dose Route Frequency Provider Last Rate Last Admin    0.9 % sodium chloride infusion   IntraVENous PRN Charlcie Quant, DO        0.9 % sodium chloride infusion   IntraVENous PRN Charlcsvetlana Quant, DO        norepinephrine (LEVOPHED) 16 mg in dextrose 5 % 250 mL infusion  1-100 mcg/min IntraVENous Continuous Ching Stevenson, DO   Stopped at 11/10/22 6686    glucose chewable tablet 16 g  4 tablet Oral PRN Chon Rodriguez MD        dextrose bolus 10% 125 mL  125 mL IntraVENous PRN Chon Rodriguez MD        Or    dextrose bolus 10% 250 mL  250 mL IntraVENous PRN Chon Rodriguez MD        glucagon (rDNA) injection 1 mg  1 mg SubCUTAneous PRN Chon Rodriguez MD        dextrose 10 % infusion   IntraVENous Continuous PRLAWRENCE Rodriguez MD  0.9 % sodium chloride infusion   IntraVENous Continuous Neha Carlisle MD 75 mL/hr at 11/10/22 1258 New Bag at 11/10/22 1258    insulin lispro (HUMALOG) injection vial 0-4 Units  0-4 Units SubCUTAneous TID  Neha Carlisle MD        insulin lispro (HUMALOG) injection vial 0-4 Units  0-4 Units SubCUTAneous Nightly Neha Carlisle MD        atorvastatin (LIPITOR) tablet 10 mg  10 mg Oral Daily Mikal Andrews,         metFORMIN (GLUCOPHAGE) tablet 1,000 mg  1,000 mg Oral BID  Mikal Ni, DO        pantoprazole (PROTONIX) 40 mg in sodium chloride (PF) 0.9 % 10 mL injection  40 mg IntraVENous Q12H Neha Carlisle MD           Allergies:  No Known Allergies    Problem List:    Patient Active Problem List   Diagnosis Code    Mass of right axilla R22.31    Skin tag L91.8    Primary osteoarthritis of right hip M16.11    DM (diabetes mellitus) ([address] E11.9    GI bleed K92.2    Acute blood loss anemia D62    Hypotension due to blood loss I95.89, R58    Acute deep vein thrombosis (DVT) of right lower extremity (HCC) I82.401       Past Medical History:        Diagnosis Date    Abdominal hernia     Arthritis     Bilateral knee pain     Diabetes mellitus ([address]     Diverticulitis     Hyperlipidemia     OKSANA on CPAP     Right hip pain        Past Surgical History:        Procedure Laterality Date    ABDOMEN SURGERY  1990'S    BOWEL SURGERY TO REPAIR RUPTURED COLON, BOWEL RESECTION WITH COLOSTOMY    ABDOMEN SURGERY  1990'S    REVERSAL OF COLOSTOMY    CATARACT EXTRACTION EXTRACAPSULAR W/ INTRAOCULAR LENS IMPLANTATION Bilateral     COLONOSCOPY      HERNIA REPAIR      STOMACH    KNEE ARTHROSCOPY      LEFT    MYRINGOTOMY  03/15/2012    right ear with nasopharyngoscopy with biopsy    TOTAL HIP ARTHROPLASTY Right 10/26/2022    ROBOTIC FERN ASSISTED RIGHT HIP TOTAL ARTHROPLASTY    +++NATANAEL+++ performed by Elaine Ponce MD at [address] [place] [address] History:    Social History     Tobacco Use    Smoking status: Never    Smokeless tobacco: Never   Substance Use Topics    Alcohol use: Yes     Comment: OCCASIONAL                                Counseling given: Not Answered      Vital Signs (Current):   Vitals:    11/10/22 1130 11/10/22 1200 11/10/22 1300 11/10/22 1400   BP:   96/61 91/63   Pulse:  (!) 127 (!) 128 (!) 129   Resp:  18 28 20   Temp: 97.8 °F (36.6 °C)      TempSrc: Oral      SpO2:  98% 95% 96%   Weight:       Height:                                                  BP Readings from Last 3 Encounters:   11/10/22 91/63   11/02/22 (!) 142/86   10/27/22 134/77       NPO Status:                                                            12 lead EKG  11/10/2022    EKG 12 Lead  Order: [id number]   Status: Final result     Visible to patient: No (not released)     Next appt: None     0 Result Notes  Component Ref Range & Units 11/10/22 0227    Ventricular Rate     Atrial Rate     P-R Interval ms 164    QRS Duration ms 70    Q-T Interval ms 318    QTc Calculation (Bazett) ms 447    P Axis degrees 97    R Axis degrees -39    T Axis degrees 36    Resulting Agency  [facility]           Narrative & Impression    Sinus tachycardia with premature atrial complexes  Left axis deviation  Low voltage QRS  Delayed precordial transition  Abnormal ECG  No previous ECGs available  Confirmed by Jose Sneed ([id number]) on 11/10/2022 5:07:05 AM                US DUP LOWER EXTREMITY RIGHT VEIN  11/02/2022       Impression   DVT, peroneal vein.       Critical results were called by Dr. Can Gauthier to Ally Lua LPN on 79/8/0725   at 12:30 p.m.       RECOMMENDATIONS:   Unavailable                 BMI:   Wt Readings from Last 3 Encounters:   11/10/22 260 lb 2.3 oz (118 kg)   11/02/22 263 lb (119.3 kg)   10/26/22 263 lb (119.3 kg)     Body mass index is 40.74 kg/m².     CBC:   Lab Results   Component Value Date/Time    WBC 21.7 11/10/2022 08:40 AM    RBC 2.70 11/10/2022 08:40 AM    HGB 8.0 11/10/2022 08:40 AM    HCT 24.5 11/10/2022 08:40 AM    MCV 90.7 11/10/2022 08:40 AM    RDW 15.5 11/10/2022 08:40 AM     11/10/2022 08:40 AM       CMP:   Lab Results   Component Value Date/Time     11/10/2022 08:40 AM    K 4.2 11/10/2022 08:40 AM     11/10/2022 08:40 AM    CO2 20 11/10/2022 08:40 AM    BUN 46 11/10/2022 08:40 AM    CREATININE 0.9 11/10/2022 08:40 AM    GFRAA >60 02/25/2020 11:02 AM    LABGLOM >60 11/10/2022 08:40 AM    GLUCOSE 227 11/10/2022 08:40 AM    PROT 5.3 11/10/2022 01:58 AM    CALCIUM 8.4 11/10/2022 08:40 AM    BILITOT 0.2 11/10/2022 01:58 AM    ALKPHOS 35 11/10/2022 01:58 AM    AST 13 11/10/2022 01:58 AM    ALT 19 11/10/2022 01:58 AM       POC Tests: No results for input(s): POCGLU, POCNA, POCK, POCCL, POCBUN, POCHEMO, POCHCT in the last 72 hours. Coags:   Lab Results   Component Value Date/Time    PROTIME 13.9 11/10/2022 08:40 AM    INR 1.3 11/10/2022 08:40 AM    APTT 29.1 11/02/2022 04:37 PM       HCG (If Applicable): No results found for: PREGTESTUR, PREGSERUM, HCG, HCGQUANT     ABGs: No results found for: PHART, PO2ART, FBA9WEM, ICT8DDI, BEART, Q5KILLSB     Type & Screen (If Applicable):  No results found for: LABABO, [address]    Anesthesia Evaluation  Patient summary reviewed and Nursing notes reviewed no history of anesthetic complications:   Airway: Mallampati: II  TM distance: <3 FB   Neck ROM: full  Mouth opening: > = 3 FB   Dental:          Pulmonary: breath sounds clear to auscultation  (+) sleep apnea: on CPAP,                             Cardiovascular:  Exercise tolerance: good (>4 METS),   (+) hyperlipidemia    (-) past MI, CAD and CABG/stent    ECG reviewed  Rhythm: regular  Rate: normal           Beta Blocker:  Not on Beta Blocker         Neuro/Psych:   Negative Neuro/Psych ROS              GI/Hepatic/Renal:   (+) morbid obesity         ROS comment: Diverticulitis  Abdominal Hernia  GI bleed.    Endo/Other:    (+) DiabetesType II DM, , : arthritis: OA., .                 Abdominal:   (+) obese,           Vascular:   + DVT, . Other Findings:             Anesthesia Plan      MAC     ASA 3       Induction: intravenous. MIPS: Postoperative opioids intended and Prophylactic antiemetics administered. Anesthetic plan and risks discussed with patient. Use of blood products discussed with patient whom consented to blood products. Plan discussed with attending.     Attending anesthesiologist reviewed and agrees with Preprocedure content            Patient seen and evaluated Deland Goldmann, APRN - CLAIR Clayton MD   11/10/2022

## 2022-11-10 NOTE — ED NOTES
Per ER MD Duglas Wong, order given to pressure bag ordered units of blood.       Jose Tellez RN  11/10/22 9758

## 2022-11-10 NOTE — H&P
[address] [address]                              HISTORY AND PHYSICAL    PATIENT NAME: Dian Tavarez                         :        1960  MED REC NO:   [id number]                            ROOM:       0217  ACCOUNT NO:   [de-identified]                           ADMIT DATE: 11/10/2022  PROVIDER:     Zahraa Taylor DO    CHIEF COMPLAINT:  Melanotic stools with weakness, fatigue, and  dizziness. HISTORY OF PRESENT ILLNESS:  This is a 58-year-old male who presented to  [address] [place] [address] Emergency Room after having noticed two bowel  movements with dark tarry stools. This was accompanied by only minimal  abdominal pain that was short-lived; however, after these dark stools  occurred, the patient began to notice increasing weakness, fatigue, and  did felt dizziness, particularly with transferring. It should be noted  that the patient recently had a right hip arthroplasty, following which  he was found to have DVT of right lower extremity. For that reason, he  was started on Eliquis for anticoagulation and within a week's time,  these present symptoms occurred. The patient came to the emergency  room, where laboratory studies were performed including CBC, which did  reveal his white cell count to be significantly low from his baseline at  8.3 with hematocrit at 26.7. Incidentally, his white cell count was  found to be significantly elevated at 27,800. His PT and INR were  checked and were stable at 13.9 and 1.3 respectively. Urinalysis showed  glucose greater than 1000, however, this patient is on an SGLT1  inhibitor. Glucose is slightly high at 313 upon arriving at the  emergency room. BUN and creatinine were slightly elevated from baseline  at 46 and 1.2 respectively with a low CO2 level.   Troponin was slightly  high at 32, however, upon questioning the patient, he was having no  chest pain, shortness of breath, or palpitations. He also denied having  had any recent fever, chills, cough, or congestion and denied having had  any urinary symptoms such as urgency, frequency, or discomfort. Prior  to his recent onset of melanotic stools, he was having no reportable GI  issues. His blood pressure upon arriving at the emergency room was  significantly low at 89/52 and did drop to 76/40 and therefore the  patient was given two units of packed red blood cells and a total of 4  liters of fluids including lactated Ringer's. He was initiated on  Levophed while in the emergency room and decision was made to transfer  to the ICU for close monitoring. I was able to see him while still in  the emergency room. The patient was otherwise stable and conversing  with me as normal, however, the patient's  was stating that he  was unable to transfer from the floor to a seated position due to his  significant weakness and fatigue. PAST MEDICAL HISTORY:  Consists of diabetes type 2, hyperlipidemia,  diverticulitis, obstructive sleep apnea, and diffuse degenerative joint  disease. PAST SURGICAL HISTORY:  Abdominal surgery including colectomy with  eventual reversal of colostomy, bilateral cataracts repair, abdominal  wall hernia repair, left knee arthroscopy, myringotomy, and right total  hip arthroplasty. MEDICATIONS:  Please see chart. ALLERGIES:  No known drug allergies. SOCIAL HISTORY:  Admits to drinking alcohol occasionally. No  recreational drug or cigarette abuse noted. FAMILY HISTORY:  Unknown at the present time. REVIEW OF SYSTEMS:  Noncontributory unless stated in above history,  otherwise negative. PHYSICAL EXAMINATION:  VITAL SIGNS:  At the time of my examination; temperature 98, pulse 127,  respirations 18, blood pressure 83/62.   GENERAL:  Well-developed, well-nourished, obese, middle-aged elderly  man, appearing his stated age, seemingly fatigued; however, otherwise,  answering questions appropriately, is friendly and willing to cooperate  with physical examination, in no acute distress. HEENT:  Atraumatic and normocephalic. Pupils are equal, round, and  reactive to light. Extraocular movements are intact. Nares are patent. External auditory canals are nonerythematous. Pharynx is clear. NECK:  Supple. No bruits. No cervical lymphadenopathy. SKIN:  No bruising, scarring, cyanosis, or erythema. Only borderline  pallor. Multiple tattoos noted. HEART:  Tachycardia. Otherwise no murmurs, gallops, or rubs. Rhythm is  regular. LUNGS:  Clear to auscultation bilaterally without wheezes or rales. ABDOMEN:  Soft and nontender. Bowel sounds are present in all four  quadrants. Large abdominal wall hernia. EXTREMITIES:  No clubbing, cyanosis, or edema. Did not test range of  motion. NEUROLOGIC:  Cranial nerves II through XII are grossly intact. No focal  neurologic deficits noted. MUSCULOSKELETAL:  The patient has had paraspinal muscle spasm. Acute  and chronic tissue texture changes in his lumbar spine. Otherwise no  excessive lordosis, kyphosis, or scoliosis. No gross bony or soft  tissue asymmetry. ASSESSMENT:  1. Acute GI bleed. 2.  Acute blood loss anemia. 3.  Hypotension due to blood loss. 4.  Diabetes type 2.  5.  DVT of right lower extremity. PLAN:  The patient has been admitted to ICU. Levophed drip started and  is being weaned as the patient's blood pressure stabilizes. GI has been  consulted and the plan is for upper endoscopy. Until that time, his  routine home oral medications will be held and the previously started  direct oral anticoagulant will be held for the time being. In that this  patient does have an acute DVT, we will ask for a Vascular consult for  their recommendations regarding the patient's ongoing DVT care. The  patient has presently been placed on insulin sliding scale.   We will  follow his H and H and lactic acid levels closely in that it appears as  though he might have had lactic acidosis, possibly related to his  elevated glucose upon admission. He has an unknown leukocytosis, for  which blood cultures and urine cultures have been sent and nares have  been swabbed for MRSA. Results of these tests are still pending. Appreciate Pulmonology's consult in assisting with this patient's care  during his time in the ICU. Further recommendations per Surgery  following EGD. For any further orders, please see chart.         Kee Chaves DO    D: 11/10/2022 11:42:33       T: 11/10/2022 11:46:45     [email or web address]  Job#: [id number]     Doc#: [id number]    CC:

## 2022-11-10 NOTE — ED NOTES
Attempt made to call Pt care report to ICU, nurse unavailable at this time.       Marylu Ordonez, RN  11/10/22 9683

## 2022-11-10 NOTE — OP NOTE
Operative Note      Patient: Juwan Rodriguez. YOB: 1960  MRN: [id number]    Date of Procedure: 11/10/2022    Pre-Op Diagnosis: Abdominal pain, unspecified abdominal location [R10.9]    Post-Op Diagnosis:  Posterior duodenal ulcer of the bulb. Procedure(s):  EGD CONTROL HEMORRHAGE    Surgeon(s):  Tone Randhawa MD    Assistant:   * No surgical staff found *    Anesthesia: Monitor Anesthesia Care    Estimated Blood Loss (mL): 946     Complications: None    Specimens:   * No specimens in log *    Implants:  * No implants in log *      Drains: * No LDAs found *    Findings: Posterior duodenal ulcer of the bulb. Controlled with epi injection, cautery, clips, and hemospray. Due to it's location it was very difficult to treatm appropriately. No evidence of bleeding at the hend of the case. Visible bleeding vessel. Detailed Description of Procedure:   Patient was laid in the left lateral decubitus position. Anesthesia was induced a timeout was conducted and the procedure was begun. A well-lubricated endoscope was inserted in the patient's oropharynx traversed down the esophagus into the stomach. The esophagus and stomach appeared normal.  The stomach did appear to have some old blood without it. The duodenal bulb was then cannulated after the pylorus was traversed. The turndown of the first and second portion of the duodenum noted to have a visible bleeding vessel with active pumping. Given this attempts were made to control this bleeding. We initially started with epinephrine injection. This was unsuccessful we then attempted resolution clipping however due to the position this was difficult to perform. Cautery was performed using gold probe with minimal improvement. We were fortunately able to get 1 clip on to it which decreased from actively spurting taken using. At this point due to the continued oozing Hemospray was applied to the bleeding. This appeared to stop the bleeding. Following this the scope was withdrawn from the stomach and removed. The patient tolerated the procedure well. Given his findings of his this particular ulcer recommend transfer to [facility] where if he were to rebleed he can undergo IR treatment.     Electronically signed by Ron Klein MD on 11/10/2022 at 4:05 PM

## 2022-11-10 NOTE — FLOWSHEET NOTE
Pt presents to the ED via EMS secondary to having a near syncopal event and having black stools. Pt currently is alert showing no signs of distress. Pt states that he is currently on anticoagulants for bilateral PE. Pt denies any chest pain, SOB or dizziness. Pt has no further complaints at this time.

## 2022-11-10 NOTE — ANESTHESIA POSTPROCEDURE EVALUATION
Department of Anesthesiology  Postprocedure Note    Patient: Jessica Hubbard MRN: [id number]  YOB: 1960  Date of evaluation: 11/10/2022      Procedure Summary     Date: 11/10/22 Room / Location: [facility] / [facility]    Anesthesia Start:  Anesthesia Stop:     Procedure: EGD ESOPHAGOGASTRODUODENOSCOPY Diagnosis:       Abdominal pain, unspecified abdominal location      (Abdominal pain, unspecified abdominal location [R10.9])    Surgeons: Emerald Coronado MD Responsible Provider:     Anesthesia Type: MAC ASA Status: 3          Anesthesia Type: No value filed.     Enedina Phase I:      Enedina Phase II:        Anesthesia Post Evaluation    Patient location during evaluation: ICU  Patient participation: complete - patient participated  Level of consciousness: awake  Pain score: 3  Airway patency: patent  Nausea & Vomiting: no nausea and no vomiting  Complications: no  Cardiovascular status: blood pressure returned to baseline  Respiratory status: acceptable  Hydration status: euvolemic

## 2022-11-10 NOTE — CONSULTS
Critical Care Admit/Consult Note         Patient - Claudell Chinchilla. MRN -  [id number]   [facility]   - 1960      Date of Admission -  11/10/2022  1:42 AM  Date of evaluation -  11/10/2022  16/16   Hospital Day - 0            ADMIT/CONSULT DETAILS     Reason for Admit/Consult   GI Bleed    Consulting [address],   Primary Care Physician - Missael Davidson DO         HPI   The patient is a 60 y.o. male with significant past medical history of recent R hip arthroplasty on  which was complicated by a lower extremity DVT who has been admitted to the ICU with acute blood loss anemia. He was recently placed on eliquis and started to notice increased frequency of black stools . He reports no abdominal pain, hematemesis, BRBPR, frequent NSAID use or a h/o GI bleeding in the past.  His initial Hgb was 8.3 from a recent value of 14.4.  follow up after 2 units of packed cells and 4L fluid was 8. 5.  he has been initiated on PPI BID and will be going for an EGD today. Family is present at bedside.           Past Medical History         Diagnosis Date    Abdominal hernia     Arthritis     Bilateral knee pain     Diabetes mellitus ([address]     Diverticulitis     Hyperlipidemia     OKSANA on CPAP     Right hip pain         Past Surgical History           Procedure Laterality Date    ABDOMEN SURGERY  'S    BOWEL SURGERY TO REPAIR RUPTURED COLON, BOWEL RESECTION WITH COLOSTOMY    ABDOMEN SURGERY  'S    REVERSAL OF COLOSTOMY    CATARACT EXTRACTION EXTRACAPSULAR W/ INTRAOCULAR LENS IMPLANTATION Bilateral     COLONOSCOPY      HERNIA REPAIR      STOMACH    KNEE ARTHROSCOPY      LEFT    MYRINGOTOMY  03/15/2012    right ear with nasopharyngoscopy with biopsy    TOTAL HIP ARTHROPLASTY Right 10/26/2022    ROBOTIC FERN ASSISTED RIGHT HIP TOTAL ARTHROPLASTY    +++NATANAEL+++ performed by Chiqui Valles MD at [address] [place] [address] Current Medications   Current Medications    lactated ringers bolus  1,000 mL IntraVENous Once    piperacillin-tazobactam  4,500 mg IntraVENous Once     sodium chloride, sodium chloride  IV Drips/Infusions   sodium chloride      sodium chloride      sodium chloride      norepinephrine       Home Medications  Not in a hospital admission. Diet/Nutrition   No diet orders on file    Allergies   Patient has no known allergies. Social History   Tobacco   reports that he has never smoked. He has never used smokeless tobacco.    Alcohol     reports current alcohol use. Occupational history :    Family History   No family history on file. Sleep History   On CPAP at home    ROS   14 point ROS obtained and neg     Lines and Devices   PIV    Mechanical Ventilation Data   VENT SETTINGS (Comprehensive)     Additional Respiratory Assessments  Heart Rate: (!) 119  Resp: 18  SpO2: 96 %    ABG  No results found for: PH, PCO2, PO2, HCO3, O2SAT  No results found for: IFIO2, MODE, SETTIDVOL, SETPEEP        Vitals    weight is 255 lb (115.7 kg). His temperature is 97.9 °F (36.6 °C). His blood pressure is 87/51 (abnormal) and his pulse is 119 (abnormal). His respiration is 18 and oxygen saturation is 96%.        Temperature Range: Temp: 97.9 °F (36.6 °C) Temp  Av.1 °F (36.7 °C)  Min: 97.5 °F (36.4 °C)  Max: 98.6 °F (37 °C)  BP Range:  Systolic (95ZBK), BND:07 , Min:70 , CWZ:89     Diastolic (17JEF), BHH:53, Min:40, Max:55    Pulse Range: Pulse  Av.8  Min: 119  Max: 134  Respiration Range: Resp  Av  Min: 18  Max: 18  Current Pulse Ox[de-identified]  SpO2: 96 %  24HR Pulse Ox Range:  SpO2  Av.2 %  Min: 96 %  Max: 100 %  Oxygen Amount and Delivery:        I/O (24 Hours)    Patient Vitals for the past 8 hrs:   BP Temp Temp src Pulse Resp SpO2 Weight   11/10/22 0510 (!) 87/51 97.9 °F (36.6 °C) -- (!) 119 18 96 % --   11/10/22 0441 (!) 83/55 97.8 °F (36.6 °C) -- (!) 121 18 97 % --   11/10/22 0355 -- -- -- -- -- -- 255 lb (115.7 kg)   11/10/22 0339 (!) 70/40 97.5 °F (36.4 °C) -- (!) 126 18 98 % --   11/10/22 0249 -- -- -- (!) 125 -- -- --   11/10/22 0246 (!) 76/40 98.6 °F (37 °C) Oral (!) 124 18 100 % --   11/10/22 0206 (!) 89/52 98.6 °F (37 °C) Oral (!) 134 18 100 % --       Intake/Output Summary (Last 24 hours) at 11/10/2022 7953  Last data filed at 11/10/2022 0510  Gross per 24 hour   Intake 637.85 ml   Output --   Net 637.85 ml     No intake/output data recorded. Date 11/10/22 0000 - 11/10/22 2359   Shift 0181-5595 9542-4569 6446-0657 24 Hour Total   INTAKE   Blood 637.9   637.9   Shift Total(mL/kg) 637. 9(5.5)   637. 9(5.5)   OUTPUT   Shift Total(mL/kg)       Weight (kg) 115.7 115.7 115.7 115.7     Patient Vitals for the past 96 hrs (Last 3 readings):   Weight   11/10/22 0355 255 lb (115.7 kg)       Exam         PHYSICAL EXAM:  General: Lying in bed comfortably, no distress, breathing is not labored  HEENT: PERRL, EOMI, MM dry, poor dentition  Neck: supple, no adenopathy  CV: tachycardic regular  Lungs: clear to auscultation bilaterally without wheezing, no crackles  Abd: soft, NT, ND, bowel sounds normal  Ext: warm, 1+ pitting edema   Skin: no rashes  Neuro: CN II-XII grossly intact, no focal deficits      Data   Old records and images have been reviewed    Lab Results   CBC     Lab Results   Component Value Date/Time    WBC 23.0 11/10/2022 03:51 AM    RBC 2.68 11/10/2022 03:51 AM    HGB 8.5 11/10/2022 03:51 AM    HCT 26.0 11/10/2022 03:51 AM     11/10/2022 03:51 AM    MCV 97.0 11/10/2022 03:51 AM    MCH 31.7 11/10/2022 03:51 AM    MCHC 32.7 11/10/2022 03:51 AM    RDW 13.5 11/10/2022 03:51 AM    LYMPHOPCT 8.1 11/10/2022 01:58 AM    MONOPCT 2.6 11/10/2022 01:58 AM    BASOPCT 0.6 11/10/2022 01:58 AM    MONOSABS 0.72 11/10/2022 01:58 AM    LYMPHSABS 2.25 11/10/2022 01:58 AM    EOSABS 0.03 11/10/2022 01:58 AM    BASOSABS 0.16 11/10/2022 01:58 AM       BMP   Lab Results   Component Value Date/Time     11/10/2022 03:51 AM    K 4.3 11/10/2022 03:51 AM     11/10/2022 03:51 AM    CO2 19 11/10/2022 03:51 AM    BUN 46 11/10/2022 03:51 AM    CREATININE 0.9 11/10/2022 03:51 AM    GLUCOSE 213 11/10/2022 03:51 AM    CALCIUM 8.6 11/10/2022 03:51 AM       LFTS  Lab Results   Component Value Date/Time    ALKPHOS 35 11/10/2022 01:58 AM    ALT 19 11/10/2022 01:58 AM    AST 13 11/10/2022 01:58 AM    PROT 5.3 11/10/2022 01:58 AM    BILITOT 0.2 11/10/2022 01:58 AM    LABALBU 3.1 11/10/2022 01:58 AM       INR  Recent Labs     11/10/22  0158   PROTIME 21.0*   INR 1.9       APTT  No results for input(s): APTT in the last 72 hours. Lactic Acid  No results found for: LACTA     BNP   No results for input(s): BNP in the last 72 hours. Cultures     No results for input(s): BC in the last 72 hours. No results for input(s): Diana Dage in the last 72 hours. No results for input(s): LABURIN in the last 72 hours. Radiology   CXR  No acute cardiopulmonary process similar to prior exam      CT Scans  N/A    EKG  Sinus tachycardia with premature atrial complexes  Left axis deviation  Low voltage QRS  Delayed precordial transition  Abnormal ECG  No previous ECGs available    Recent US of the R leg     Impression   DVT, peroneal vein. SYSTEMS ASSESSMENT    Hemorrhagic shock  Acute blood loss anemia  Right peroneal vein DVT  Tachycardia  Recent R hip arthroplasty  OKSANA on CPAP  Leukocytosis  Lactic acidosis  Volume depletion  Diabetes    Plan:  Transfuse as indicated  Serial H/H  PPI BID  IVF  Obtain CTA to assess for PE  Repeat lower extremity US to assess for clot propagation  Stop eliquis  Sliding scale insulin q6h  NPO  Nocturnal CPAP    34 minutes of CCT spent with the patient, reviewing the chart including imaging studies, and discussing the case with other health care professionals. This time excludes procedures.      Neha Carlisle MD

## 2022-11-10 NOTE — ANESTHESIA POSTPROCEDURE EVALUATION
Department of Anesthesiology  Postprocedure Note    Patient: Dom Duncan MRN: [id number]  YOB: 1960  Date of evaluation: 11/10/2022      Procedure Summary     Date: 11/10/22 Room / Location: [facility] / [facility]    Anesthesia Start: 3803 Anesthesia Stop:     Procedure: EGD CONTROL HEMORRHAGE Diagnosis:       Abdominal pain, unspecified abdominal location      (Abdominal pain, unspecified abdominal location [R10.9])    Surgeons: Scotty Yeung MD Responsible Provider: Eladio Hermosillo MD    Anesthesia Type: MAC ASA Status: 3          Anesthesia Type: No value filed.     Enedina Phase I:      Enedina Phase II:        Anesthesia Post Evaluation    Patient location during evaluation: ICU  Patient participation: complete - patient participated  Level of consciousness: awake  Airway patency: patent  Nausea & Vomiting: no nausea and no vomiting  Complications: no  Cardiovascular status: hemodynamically stable  Respiratory status: acceptable  Hydration status: euvolemic

## 2022-11-10 NOTE — CONSULTS
Department of General Surgery - Adult  Surgical Service   Attending Consult Note      Reason for Consult:  GIB  Requesting Physician:  ER    CHIEF COMPLAINT:  melena    History Obtained From:  patient    HISTORY OF PRESENT ILLNESS:                The patient is a 60 y.o. male who presents with melena. He has been on eliquis. He recently underwent orthopedic surgery. He has been having significant amounts of melena. He denies any fevers or chills. He does have some nausea, he denies hematemesis.     Past Medical History:        Diagnosis Date    Abdominal hernia     Arthritis     Bilateral knee pain     Diabetes mellitus (HCC)     Diverticulitis     Hyperlipidemia     OKSANA on CPAP     Right hip pain      Past Surgical History:        Procedure Laterality Date    ABDOMEN SURGERY  1990'S    BOWEL SURGERY TO REPAIR RUPTURED COLON, BOWEL RESECTION WITH COLOSTOMY    ABDOMEN SURGERY  1990'S    REVERSAL OF COLOSTOMY    CATARACT EXTRACTION EXTRACAPSULAR W/ INTRAOCULAR LENS IMPLANTATION Bilateral     COLONOSCOPY      HERNIA REPAIR      STOMACH    KNEE ARTHROSCOPY      LEFT    MYRINGOTOMY  03/15/2012    right ear with nasopharyngoscopy with biopsy    TOTAL HIP ARTHROPLASTY Right 10/26/2022    ROBOTIC FERN ASSISTED RIGHT HIP TOTAL ARTHROPLASTY    +++NATANAEL+++ performed by Chiqui Valles MD at [address] LEFT EAR     Current Medications:   Current Facility-Administered Medications: 0.9 % sodium chloride infusion, , IntraVENous, PRN  0.9 % sodium chloride infusion, , IntraVENous, PRN  norepinephrine (LEVOPHED) 16 mg in dextrose 5 % 250 mL infusion, 1-100 mcg/min, IntraVENous, Continuous  glucose chewable tablet 16 g, 4 tablet, Oral, PRN  dextrose bolus 10% 125 mL, 125 mL, IntraVENous, PRN **OR** dextrose bolus 10% 250 mL, 250 mL, IntraVENous, PRN  glucagon (rDNA) injection 1 mg, 1 mg, SubCUTAneous, PRN  dextrose 10 % infusion, , IntraVENous, Continuous PRN  0.9 % sodium chloride infusion, , IntraVENous, Continuous  insulin lispro (HUMALOG) injection vial 0-4 Units, 0-4 Units, SubCUTAneous, TID WC  insulin lispro (HUMALOG) injection vial 0-4 Units, 0-4 Units, SubCUTAneous, Nightly  atorvastatin (LIPITOR) tablet 10 mg, 10 mg, Oral, Daily  metFORMIN (GLUCOPHAGE) tablet 1,000 mg, 1,000 mg, Oral, BID WC  pantoprazole (PROTONIX) 40 mg in sodium chloride (PF) 0.9 % 10 mL injection, 40 mg, IntraVENous, Q12H  EPINEPHrine PF 1 MG/ML injection (Anaphylaxis), , ,   Allergies:  Patient has no known allergies. Social History:   TOBACCO:   reports that he has never smoked. He has never used smokeless tobacco.  ETOH:   reports current alcohol use. DRUGS:   reports no history of drug use. Family History:   No family history on file. REVIEW OF SYSTEMS:    A comprehensive ROS was obtained and was negative with exception of the HPI.     PHYSICAL EXAM:    VITALS:  /66   Pulse (!) 134   Temp 99.9 °F (37.7 °C) (Axillary)   Resp 30   Ht 5' 7\" (1.702 m)   Wt 260 lb 2.3 oz (118 kg)   SpO2 100%   BMI 40.74 kg/m²   General: mild acute distress, pallorous, AAOx3  Eyes: Extraocular movements intact, no scleral icterus  Respiratory: Normal work of breathing, no cyanosis  Cardiovascular: delayed capillary refill, 2+ radial pulse  Abdomen: Soft nontender to palpation nondistended  Skin: Normal skin turgor, no rashes  Extremities: No deformities, no contractures  Neurologic: No focal neurologic deficits, AAO x3    DATA:    CBC with Differential:    Lab Results   Component Value Date/Time    WBC 21.7 11/10/2022 08:40 AM    RBC 2.70 11/10/2022 08:40 AM    HGB 8.0 11/10/2022 08:40 AM    HCT 24.5 11/10/2022 08:40 AM     11/10/2022 08:40 AM    MCV 90.7 11/10/2022 08:40 AM    MCH 29.6 11/10/2022 08:40 AM    MCHC 32.7 11/10/2022 08:40 AM    RDW 15.5 11/10/2022 08:40 AM    LYMPHOPCT 9.0 11/10/2022 08:40 AM    MONOPCT 3.3 11/10/2022 08:40 AM    BASOPCT 0.3 11/10/2022 08:40 AM    MONOSABS 0.72 11/10/2022 08:40 AM LYMPHSABS 1.95 11/10/2022 08:40 AM    EOSABS 0.00 11/10/2022 08:40 AM    BASOSABS 0.07 11/10/2022 08:40 AM     BMP:    Lab Results   Component Value Date/Time     11/10/2022 08:40 AM    K 4.2 11/10/2022 08:40 AM     11/10/2022 08:40 AM    CO2 20 11/10/2022 08:40 AM    BUN 46 11/10/2022 08:40 AM    LABALBU 3.1 11/10/2022 01:58 AM    CREATININE 0.9 11/10/2022 08:40 AM    CALCIUM 8.4 11/10/2022 08:40 AM    GFRAA >60 02/25/2020 11:02 AM    LABGLOM >60 11/10/2022 08:40 AM    GLUCOSE 227 11/10/2022 08:40 AM       IMPRESSION/RECOMMENDATIONS:      60 yom with melena  - plan for emergent EGD with control of bleeding.  - given his posterior bleeding ulcer seen on endoscopy. Would recommend transfer as he is at high risk for rebleeding.  - continue holding anticoagulation.     Oliva Youngblood MD

## 2022-11-10 NOTE — PATIENT CARE CONFERENCE
Intensive Care Daily Quality Rounding Checklist      ICU Team Members: Bedside Nurse, , , Nursing Unit Leadership, and Dr. Israel Collado    ICU Day #: NUMBER: 1    Intubation Date:  na   na    Ventilator Day #: NUMBER: na    Central Line Insertion Date:  0   0        Day #: NUMBER: 0     Arterial Line Insertion Date:  0   0      Day #: NUMBER: 0    Temporary Hemodialysis Catheter Insertion Date:  0   0      Day # NUMBER: 0    DVT Prophylaxis: HOLD     GI Prophylaxis:Protonix    Rose Catheter Insertion Date:  0   0       Day #: 0      Continued need (if yes, reason documented and discussed with physician): yes, 0    Skin Issues/ Wounds and ordered treatment discussed on rounds: n    Goals/ Plans for the Day:  monitor hgb and replace as needed, stat CTA , US of BLE, EGD today

## 2022-11-10 NOTE — PROGRESS NOTES
Pharmacy Note    This patient was ordered Jardiance. Per the [address] [place] [address] Dr, this medication is non-formulary and not stocked by pharmacy for the reason indicated below. The medication can be reordered at discharge. Medications in which risks outweigh benefits during hospitalization:               -  SGLT2 inhibitors (ordered in the hospital for an indication other than heart failure or chronic kidney disease)  Thank You, Marialuisa Casas Pharm. D 11/10/2022 11:26 AM

## 2022-11-11 ENCOUNTER — HOSPITAL ENCOUNTER (INPATIENT)
Age: 62
LOS: 3 days | Discharge: HOME HEALTH | DRG: 813 | End: 2022-11-14
Attending: INTERNAL MEDICINE | Admitting: INTERNAL MEDICINE
Payer: COMMERCIAL

## 2022-11-11 VITALS
TEMPERATURE: 99 F | WEIGHT: 260.14 LBS | HEART RATE: 120 BPM | OXYGEN SATURATION: 95 % | RESPIRATION RATE: 20 BRPM | DIASTOLIC BLOOD PRESSURE: 78 MMHG | BODY MASS INDEX: 40.83 KG/M2 | HEIGHT: 67 IN | SYSTOLIC BLOOD PRESSURE: 103 MMHG

## 2022-11-11 LAB
ABO/RH: NORMAL
ACINETOBACTER CALCOAC BAUMANNII COMPLEX BY PCR: NOT DETECTED
ALBUMIN SERPL-MCNC: 2.7 G/DL (ref 3.5–5.2)
ALP BLD-CCNC: 27 U/L (ref 40–129)
ALT SERPL-CCNC: 15 U/L (ref 0–40)
ANGLE (CLOT STRENGTH): 74.9 DEGREE (ref 59–74)
ANGLE (CLOT STRENGTH): 76.2 DEGREE (ref 59–74)
ANION GAP SERPL CALCULATED.3IONS-SCNC: 11 MMOL/L (ref 7–16)
ANISOCYTOSIS: ABNORMAL
ANTIBODY SCREEN: NORMAL
AST SERPL-CCNC: 13 U/L (ref 0–39)
BACTERIA: ABNORMAL /HPF
BACTEROIDES FRAGILIS BY PCR: NOT DETECTED
BASOPHILS ABSOLUTE: 0 E9/L (ref 0–0.2)
BASOPHILS RELATIVE PERCENT: 0.3 % (ref 0–2)
BILIRUB SERPL-MCNC: 0.3 MG/DL (ref 0–1.2)
BILIRUBIN URINE: NEGATIVE
BLOOD, URINE: NEGATIVE
BOTTLE TYPE: ABNORMAL
BUN BLDV-MCNC: 48 MG/DL (ref 6–23)
BURR CELLS: ABNORMAL
CALCIUM SERPL-MCNC: 8.1 MG/DL (ref 8.6–10.2)
CANDIDA ALBICANS BY PCR: NOT DETECTED
CANDIDA AURIS BY PCR: NOT DETECTED
CANDIDA GLABRATA BY PCR: NOT DETECTED
CANDIDA KRUSEI BY PCR: NOT DETECTED
CANDIDA PARAPSILOSIS BY PCR: NOT DETECTED
CANDIDA TROPICALIS BY PCR: NOT DETECTED
CHLORIDE BLD-SCNC: 115 MMOL/L (ref 98–107)
CLARITY: CLEAR
CO2: 20 MMOL/L (ref 22–29)
COLOR: YELLOW
CREAT SERPL-MCNC: 1 MG/DL (ref 0.7–1.2)
CRYPTOCOCCUS NEOFORMANS/GATTII BY PCR: NOT DETECTED
ENTEROBACTER CLOACAE COMPLEX BY PCR: NOT DETECTED
ENTEROBACTERALES BY PCR: NOT DETECTED
ENTEROCOCCUS FAECALIS BY PCR: NOT DETECTED
ENTEROCOCCUS FAECIUM BY PCR: NOT DETECTED
EOSINOPHILS ABSOLUTE: 0 E9/L (ref 0.05–0.5)
EOSINOPHILS RELATIVE PERCENT: 0 % (ref 0–6)
EPL-TEG: 0 % (ref 0–15)
EPL-TEG: 0 % (ref 0–15)
ESCHERICHIA COLI BY PCR: NOT DETECTED
G-TEG: 11.3 K D/SC (ref 4.5–11)
G-TEG: 12.8 K D/SC (ref 4.5–11)
GFR SERPL CREATININE-BSD FRML MDRD: >60 ML/MIN/1.73
GLUCOSE BLD-MCNC: 237 MG/DL (ref 74–99)
GLUCOSE URINE: 250 MG/DL
HAEMOPHILUS INFLUENZAE BY PCR: NOT DETECTED
HCT VFR BLD CALC: 21.6 % (ref 37–54)
HCT VFR BLD CALC: 22.2 % (ref 37–54)
HCT VFR BLD CALC: 24.1 % (ref 37–54)
HCT VFR BLD CALC: 26.1 % (ref 37–54)
HCT VFR BLD CALC: 26.6 % (ref 37–54)
HEMOGLOBIN: 6.9 G/DL (ref 12.5–16.5)
HEMOGLOBIN: 7.1 G/DL (ref 12.5–16.5)
HEMOGLOBIN: 8 G/DL (ref 12.5–16.5)
HEMOGLOBIN: 8.8 G/DL (ref 12.5–16.5)
HEMOGLOBIN: 8.8 G/DL (ref 12.5–16.5)
INR BLD: 1.4
K (CLOTTING TIME): 0.9 MIN (ref 1–3)
K (CLOTTING TIME): 1.1 MIN (ref 1–3)
KETONES, URINE: NEGATIVE MG/DL
KLEBSIELLA AEROGENES BY PCR: NOT DETECTED
KLEBSIELLA OXYTOCA BY PCR: NOT DETECTED
KLEBSIELLA PNEUMONIAE GROUP BY PCR: NOT DETECTED
LACTIC ACID: 2.4 MMOL/L (ref 0.5–2.2)
LACTIC ACID: 2.9 MMOL/L (ref 0.5–2.2)
LACTIC ACID: 3 MMOL/L (ref 0.5–2.2)
LACTIC ACID: 3.4 MMOL/L (ref 0.5–2.2)
LEUKOCYTE ESTERASE, URINE: NEGATIVE
LISTERIA MONOCYTOGENES BY PCR: NOT DETECTED
LY30 (FIBRINOLYSIS): 0 % (ref 0–8)
LY30 (FIBRINOLYSIS): 0 % (ref 0–8)
LYMPHOCYTES ABSOLUTE: 1.26 E9/L (ref 1.5–4)
LYMPHOCYTES RELATIVE PERCENT: 4.4 % (ref 20–42)
MA (MAX AMPLITUDE): 69.4 MM (ref 50–70)
MA (MAX AMPLITUDE): 72 MM (ref 50–70)
MCH RBC QN AUTO: 29.7 PG (ref 26–35)
MCHC RBC AUTO-ENTMCNC: 33.1 % (ref 32–34.5)
MCV RBC AUTO: 89.9 FL (ref 80–99.9)
METER GLUCOSE: 166 MG/DL (ref 74–99)
METER GLUCOSE: 169 MG/DL (ref 74–99)
METER GLUCOSE: 203 MG/DL (ref 74–99)
METER GLUCOSE: 217 MG/DL (ref 74–99)
METER GLUCOSE: 248 MG/DL (ref 74–99)
METHICILLIN RESISTANCE MECA/C  BY PCR: NOT DETECTED
MONOCYTES ABSOLUTE: 0.32 E9/L (ref 0.1–0.95)
MONOCYTES RELATIVE PERCENT: 0.9 % (ref 2–12)
MRSA CULTURE ONLY: NORMAL
NEISSERIA MENINGITIDIS BY PCR: NOT DETECTED
NEUTROPHILS ABSOLUTE: 29.93 E9/L (ref 1.8–7.3)
NEUTROPHILS RELATIVE PERCENT: 94.7 % (ref 43–80)
NITRITE, URINE: NEGATIVE
ORDER NUMBER: ABNORMAL
OVALOCYTES: ABNORMAL
PDW BLD-RTO: 17.2 FL (ref 11.5–15)
PH UA: 5.5 (ref 5–9)
PLATELET # BLD: 285 E9/L (ref 130–450)
PMV BLD AUTO: 9.7 FL (ref 7–12)
POIKILOCYTES: ABNORMAL
POLYCHROMASIA: ABNORMAL
POTASSIUM REFLEX MAGNESIUM: 3.6 MMOL/L (ref 3.5–5)
PROCALCITONIN: 0.35 NG/ML (ref 0–0.08)
PROTEIN UA: NEGATIVE MG/DL
PROTEUS SPECIES BY PCR: NOT DETECTED
PROTHROMBIN TIME: 15.5 SEC (ref 9.3–12.4)
PSEUDOMONAS AERUGINOSA BY PCR: NOT DETECTED
R (REACTION TIME): 4.2 MIN (ref 5–10)
R (REACTION TIME): 4.5 MIN (ref 5–10)
RBC # BLD: 2.96 E12/L (ref 3.8–5.8)
RBC UA: ABNORMAL /HPF (ref 0–2)
SALMONELLA SPECIES BY PCR: NOT DETECTED
SERRATIA MARCESCENS BY PCR: NOT DETECTED
SMUDGE CELLS: ABNORMAL
SODIUM BLD-SCNC: 146 MMOL/L (ref 132–146)
SOURCE OF BLOOD CULTURE: ABNORMAL
SPECIFIC GRAVITY UA: 1.02 (ref 1–1.03)
STAPHYLOCOCCUS AUREUS BY PCR: NOT DETECTED
STAPHYLOCOCCUS EPIDERMIDIS BY PCR: DETECTED
STAPHYLOCOCCUS LUGDUNENSIS BY PCR: NOT DETECTED
STAPHYLOCOCCUS SPECIES BY PCR: DETECTED
STENOTROPHOMONAS MALTOPHILIA BY PCR: NOT DETECTED
STREPTOCOCCUS AGALACTIAE BY PCR: NOT DETECTED
STREPTOCOCCUS PNEUMONIAE BY PCR: NOT DETECTED
STREPTOCOCCUS PYOGENES  BY PCR: NOT DETECTED
STREPTOCOCCUS SPECIES BY PCR: NOT DETECTED
TOTAL PROTEIN: 4.8 G/DL (ref 6.4–8.3)
UROBILINOGEN, URINE: 0.2 E.U./DL
WBC # BLD: 31.5 E9/L (ref 4.5–11.5)
WBC UA: ABNORMAL /HPF (ref 0–5)

## 2022-11-11 PROCEDURE — 85384 FIBRINOGEN ACTIVITY: CPT

## 2022-11-11 PROCEDURE — 6360000002 HC RX W HCPCS: Performed by: INTERNAL MEDICINE

## 2022-11-11 PROCEDURE — 6370000000 HC RX 637 (ALT 250 FOR IP): Performed by: INTERNAL MEDICINE

## 2022-11-11 PROCEDURE — P9016 RBC LEUKOCYTES REDUCED: HCPCS

## 2022-11-11 PROCEDURE — 36415 COLL VENOUS BLD VENIPUNCTURE: CPT

## 2022-11-11 PROCEDURE — 86920 COMPATIBILITY TEST SPIN: CPT

## 2022-11-11 PROCEDURE — 85610 PROTHROMBIN TIME: CPT

## 2022-11-11 PROCEDURE — 85018 HEMOGLOBIN: CPT

## 2022-11-11 PROCEDURE — 80053 COMPREHEN METABOLIC PANEL: CPT

## 2022-11-11 PROCEDURE — 85014 HEMATOCRIT: CPT

## 2022-11-11 PROCEDURE — 6360000002 HC RX W HCPCS

## 2022-11-11 PROCEDURE — 2580000003 HC RX 258

## 2022-11-11 PROCEDURE — 82962 GLUCOSE BLOOD TEST: CPT

## 2022-11-11 PROCEDURE — 86850 RBC ANTIBODY SCREEN: CPT

## 2022-11-11 PROCEDURE — 87338 HPYLORI STOOL AG IA: CPT

## 2022-11-11 PROCEDURE — 2580000003 HC RX 258: Performed by: INTERNAL MEDICINE

## 2022-11-11 PROCEDURE — 85576 BLOOD PLATELET AGGREGATION: CPT

## 2022-11-11 PROCEDURE — 81001 URINALYSIS AUTO W/SCOPE: CPT

## 2022-11-11 PROCEDURE — 99291 CRITICAL CARE FIRST HOUR: CPT | Performed by: INTERNAL MEDICINE

## 2022-11-11 PROCEDURE — 87070 CULTURE OTHR SPECIMN AEROBIC: CPT

## 2022-11-11 PROCEDURE — 6370000000 HC RX 637 (ALT 250 FOR IP)

## 2022-11-11 PROCEDURE — C9113 INJ PANTOPRAZOLE SODIUM, VIA: HCPCS | Performed by: INTERNAL MEDICINE

## 2022-11-11 PROCEDURE — 86900 BLOOD TYPING SEROLOGIC ABO: CPT

## 2022-11-11 PROCEDURE — 87040 BLOOD CULTURE FOR BACTERIA: CPT

## 2022-11-11 PROCEDURE — 85025 COMPLETE CBC W/AUTO DIFF WBC: CPT

## 2022-11-11 PROCEDURE — 83605 ASSAY OF LACTIC ACID: CPT

## 2022-11-11 PROCEDURE — 2000000000 HC ICU R&B

## 2022-11-11 PROCEDURE — 86901 BLOOD TYPING SEROLOGIC RH(D): CPT

## 2022-11-11 PROCEDURE — 84145 PROCALCITONIN (PCT): CPT

## 2022-11-11 PROCEDURE — 85347 COAGULATION TIME ACTIVATED: CPT

## 2022-11-11 RX ORDER — SODIUM CHLORIDE, SODIUM LACTATE, POTASSIUM CHLORIDE, CALCIUM CHLORIDE 600; 310; 30; 20 MG/100ML; MG/100ML; MG/100ML; MG/100ML
INJECTION, SOLUTION INTRAVENOUS CONTINUOUS
Status: DISCONTINUED | OUTPATIENT
Start: 2022-11-11 | End: 2022-11-13

## 2022-11-11 RX ORDER — ONDANSETRON 2 MG/ML
4 INJECTION INTRAMUSCULAR; INTRAVENOUS EVERY 6 HOURS PRN
Status: DISCONTINUED | OUTPATIENT
Start: 2022-11-11 | End: 2022-11-14 | Stop reason: HOSPADM

## 2022-11-11 RX ORDER — INSULIN LISPRO 100 [IU]/ML
0-4 INJECTION, SOLUTION INTRAVENOUS; SUBCUTANEOUS
Status: DISCONTINUED | OUTPATIENT
Start: 2022-11-11 | End: 2022-11-11

## 2022-11-11 RX ORDER — ONDANSETRON 4 MG/1
4 TABLET, ORALLY DISINTEGRATING ORAL EVERY 8 HOURS PRN
Status: DISCONTINUED | OUTPATIENT
Start: 2022-11-11 | End: 2022-11-14 | Stop reason: HOSPADM

## 2022-11-11 RX ORDER — ACETAMINOPHEN 325 MG/1
650 TABLET ORAL EVERY 6 HOURS PRN
Status: DISCONTINUED | OUTPATIENT
Start: 2022-11-11 | End: 2022-11-14 | Stop reason: HOSPADM

## 2022-11-11 RX ORDER — SODIUM CHLORIDE 0.9 % (FLUSH) 0.9 %
5-40 SYRINGE (ML) INJECTION PRN
Status: DISCONTINUED | OUTPATIENT
Start: 2022-11-11 | End: 2022-11-14 | Stop reason: HOSPADM

## 2022-11-11 RX ORDER — SODIUM CHLORIDE 0.9 % (FLUSH) 0.9 %
5-40 SYRINGE (ML) INJECTION EVERY 12 HOURS SCHEDULED
Status: DISCONTINUED | OUTPATIENT
Start: 2022-11-11 | End: 2022-11-14 | Stop reason: HOSPADM

## 2022-11-11 RX ORDER — ACETAMINOPHEN 650 MG/1
650 SUPPOSITORY RECTAL EVERY 6 HOURS PRN
Status: DISCONTINUED | OUTPATIENT
Start: 2022-11-11 | End: 2022-11-14 | Stop reason: HOSPADM

## 2022-11-11 RX ORDER — INSULIN LISPRO 100 [IU]/ML
0-4 INJECTION, SOLUTION INTRAVENOUS; SUBCUTANEOUS
Status: DISCONTINUED | OUTPATIENT
Start: 2022-11-11 | End: 2022-11-14 | Stop reason: HOSPADM

## 2022-11-11 RX ORDER — DEXTROSE MONOHYDRATE 100 MG/ML
INJECTION, SOLUTION INTRAVENOUS CONTINUOUS PRN
Status: DISCONTINUED | OUTPATIENT
Start: 2022-11-11 | End: 2022-11-14 | Stop reason: HOSPADM

## 2022-11-11 RX ORDER — SODIUM CHLORIDE 9 MG/ML
INJECTION, SOLUTION INTRAVENOUS PRN
Status: DISCONTINUED | OUTPATIENT
Start: 2022-11-11 | End: 2022-11-14 | Stop reason: HOSPADM

## 2022-11-11 RX ORDER — INSULIN LISPRO 100 [IU]/ML
0-4 INJECTION, SOLUTION INTRAVENOUS; SUBCUTANEOUS NIGHTLY
Status: DISCONTINUED | OUTPATIENT
Start: 2022-11-11 | End: 2022-11-14 | Stop reason: HOSPADM

## 2022-11-11 RX ORDER — POLYETHYLENE GLYCOL 3350 17 G/17G
17 POWDER, FOR SOLUTION ORAL DAILY PRN
Status: DISCONTINUED | OUTPATIENT
Start: 2022-11-11 | End: 2022-11-14 | Stop reason: HOSPADM

## 2022-11-11 RX ADMIN — SODIUM CHLORIDE, POTASSIUM CHLORIDE, SODIUM LACTATE AND CALCIUM CHLORIDE: 600; 310; 30; 20 INJECTION, SOLUTION INTRAVENOUS at 08:48

## 2022-11-11 RX ADMIN — SODIUM CHLORIDE, PRESERVATIVE FREE 40 MG: 5 INJECTION INTRAVENOUS at 15:36

## 2022-11-11 RX ADMIN — INSULIN LISPRO 1 UNITS: 100 INJECTION, SOLUTION INTRAVENOUS; SUBCUTANEOUS at 08:55

## 2022-11-11 RX ADMIN — POLYETHYLENE GLYCOL 3350 17 G: 17 POWDER, FOR SOLUTION ORAL at 08:55

## 2022-11-11 RX ADMIN — POTASSIUM BICARBONATE 40 MEQ: 782 TABLET, EFFERVESCENT ORAL at 08:54

## 2022-11-11 RX ADMIN — Medication 10 ML: at 08:56

## 2022-11-11 RX ADMIN — SODIUM CHLORIDE, PRESERVATIVE FREE 40 MG: 5 INJECTION INTRAVENOUS at 03:25

## 2022-11-11 RX ADMIN — INSULIN LISPRO 1 UNITS: 100 INJECTION, SOLUTION INTRAVENOUS; SUBCUTANEOUS at 12:06

## 2022-11-11 RX ADMIN — INSULIN LISPRO 1 UNITS: 100 INJECTION, SOLUTION INTRAVENOUS; SUBCUTANEOUS at 04:12

## 2022-11-11 RX ADMIN — SODIUM CHLORIDE, POTASSIUM CHLORIDE, SODIUM LACTATE AND CALCIUM CHLORIDE: 600; 310; 30; 20 INJECTION, SOLUTION INTRAVENOUS at 21:15

## 2022-11-11 ASSESSMENT — PAIN SCALES - GENERAL
PAINLEVEL_OUTOF10: 0
PAINLEVEL_OUTOF10: 0

## 2022-11-11 NOTE — ACP (ADVANCE CARE PLANNING)
Advance Care Planning   Healthcare Decision Maker:    Primary Decision Maker: Jcarlos Carlton - [facility] - [phone]    Secondary Decision Maker: David Ng [phone]    Electronically signed by Jose Burnett RN on 11/11/2022 at 2:34 PM

## 2022-11-11 NOTE — PROGRESS NOTES
[facility] All questions answered. Notified of pickup time of 2300.

## 2022-11-11 NOTE — H&P
[facility]  History and Physical      CHIEF COMPLAINT:    No chief complaint on file. Patient of Tone Tolbertmaday  presents with:  GI bleed    History of Present Illness:   Patient is 69-year-old male with past medical history of abdominal hernia, arthritis, bilateral knee pain, diabetes, diverticulitis, hyperlipidemia, OKSANA on CPAP, and right hip pain. Patient presented to [facility] ED on 11/10 with complaints of dark tarry stools. Patient did experience some minimal abdominal pain however after the start stools occur the began began to notice increasing weakness fatigue and felt dizzy particularly with mobilization. Patient had a recent 10/25/2022 right hip arthroplasty which at that time was found to have a DVT in his right leg. Patient was started on Eliquis and approximately 1 week later the symptoms began. Patient was having GI issues prior to these episodes of dark tarry stools. ER work-up revealed SBP 76/40 patient was given 2 units of packed red blood cells 4 L of fluids placed on Levophed and admitted to the ICU. General surgery was consulted as well as vascular patient had an EGD that revealed a posterior duodenal ulcer of the bulb and decision was made to transfer patient to [facility] as he is at a high risk for rebleeding. Awake alert oriented and able to answer all questions appropriately on evaluation, no abdominal pain or cramping is reported      REVIEW OF SYSTEMS:  Pertinent negatives are above in HPI. 10 point ROS otherwise negative.       Past Medical History:   Diagnosis Date    Abdominal hernia     Arthritis     Bilateral knee pain     Diabetes mellitus ([address]     Diverticulitis     Hyperlipidemia     OKSANA on CPAP     Right hip pain          Past Surgical History:   Procedure Laterality Date    ABDOMEN SURGERY  1990'S    BOWEL SURGERY TO REPAIR RUPTURED COLON, BOWEL RESECTION WITH COLOSTOMY    ABDOMEN SURGERY  1990'S    REVERSAL OF COLOSTOMY    CATARACT EXTRACTION EXTRACAPSULAR W/ INTRAOCULAR LENS IMPLANTATION Bilateral     COLONOSCOPY      HERNIA REPAIR      STOMACH    KNEE ARTHROSCOPY      LEFT    MYRINGOTOMY  03/15/2012    right ear with nasopharyngoscopy with biopsy    TOTAL HIP ARTHROPLASTY Right 10/26/2022    ROBOTIC FERN ASSISTED RIGHT HIP TOTAL ARTHROPLASTY    +++NATANAEL+++ performed by Gevena Cranker, MD at [facility] LEFT EAR    UPPER GASTROINTESTINAL ENDOSCOPY N/A 11/10/2022    EGD CONTROL HEMORRHAGE performed by Chan Barnes MD at [facility] ENDOSCOPY       Medications Prior to Admission:    Medications Prior to Admission: polyethylene glycol (MIRALAX) 17 g packet, Take 17 g by mouth daily as needed for Constipation  apixaban starter pack (ELIQUIS DVT/PE STARTER PACK) 5 MG TBPK tablet, Take 1 tablet by mouth See Admin Instructions  dapagliflozin (FARXIGA) 5 MG tablet, Take 5 mg by mouth every morning  metFORMIN (GLUCOPHAGE) 1000 MG tablet, Take 1,000 mg by mouth 2 times daily (with meals)  atorvastatin (LIPITOR) 10 MG tablet, Take 10 mg by mouth daily  etodolac (LODINE XL) 400 MG SR tablet, Take 400 mg by mouth daily as needed. Note that the patient's home medications were reviewed and the above list is accurate to the best of my knowledge at the time of the exam.    Allergies:    Patient has no known allergies. Social History:    reports that he has never smoked. He has never used smokeless tobacco. He reports current alcohol use. He reports that he does not use drugs.     Family History:   Unable to obtain at this time      PHYSICAL EXAM:    Vitals:  /70   Pulse (!) 110   Temp 98.5 °F (36.9 °C) (Temporal)   Resp 21   Wt 256 lb (116.1 kg)   SpO2 94%   BMI 40.10 kg/m²       General appearance: NAD, conversant  Eyes: Sclerae anicteric, PERRLA  HEENT: AT/NC, MMM  Neck: FROM, supple, no thyromegaly  Lymph: No cervical / supraclavicular lymphadenopathy  Lungs: Clear to auscultation, WOB normal  CV: RRR, no MRGs, no lower extremity edema  Abdomen: Soft, non-tender; no masses or HSM, +BS-distended nontender  Extremities: FROM without synovitis. No clubbing or cyanosis of the hands. Skin: no rash, induration, lesions, or ulcers  Psych: Calm and cooperative. Normal judgement and insight. Normal mood and affect. Neuro: Alert and interactive, face symmetric, speech fluent. LABS:  All labs reviewed. Of note:  CBC with Differential:    Lab Results   Component Value Date/Time    WBC 31.5 11/11/2022 02:35 AM    RBC 2.96 11/11/2022 02:35 AM    HGB 8.0 11/11/2022 09:11 AM    HCT 24.1 11/11/2022 09:11 AM     11/11/2022 02:35 AM    MCV 89.9 11/11/2022 02:35 AM    MCH 29.7 11/11/2022 02:35 AM    MCHC 33.1 11/11/2022 02:35 AM    RDW 17.2 11/11/2022 02:35 AM    LYMPHOPCT 4.4 11/11/2022 02:35 AM    MONOPCT 0.9 11/11/2022 02:35 AM    BASOPCT 0.3 11/11/2022 02:35 AM    MONOSABS 0.32 11/11/2022 02:35 AM    LYMPHSABS 1.26 11/11/2022 02:35 AM    EOSABS 0.00 11/11/2022 02:35 AM    BASOSABS 0.00 11/11/2022 02:35 AM     CMP:    Lab Results   Component Value Date/Time     11/11/2022 02:35 AM    K 3.6 11/11/2022 02:35 AM     11/11/2022 02:35 AM    CO2 20 11/11/2022 02:35 AM    BUN 48 11/11/2022 02:35 AM    CREATININE 1.0 11/11/2022 02:35 AM    GFRAA >60 02/25/2020 11:02 AM    LABGLOM >60 11/11/2022 02:35 AM    GLUCOSE 237 11/11/2022 02:35 AM    PROT 4.8 11/11/2022 02:35 AM    LABALBU 2.7 11/11/2022 02:35 AM    CALCIUM 8.1 11/11/2022 02:35 AM    BILITOT 0.3 11/11/2022 02:35 AM    ALKPHOS 27 11/11/2022 02:35 AM    AST 13 11/11/2022 02:35 AM    ALT 15 11/11/2022 02:35 AM       Imaging:      EKG:  I've personally reviewed the patient's EKG:  Sinus tachycardia    Telemetry:  I've personally reviewed the patient's telemetry:      ASSESSMENT/PLAN:  Principal Problem:    GI bleed  Resolved Problems:    * No resolved hospital problems.  *    60-year-old male with a recent hip arthroplasty developed a DVT and placed on Eliquis presented to [facility] ED with black tarry stools had a EGD performed and has a duodenal bulb ulcer transferred to [facility] and is admitted to the ICU with    GI bleed, likely upper GI source from oral anticoagulation  -Ensure hemodynamic stability with primarily volume resuscitation with fluids versus blood, pressor support if declining blood pressure  -H&H every 6 Hours transfuse as needed to keep hemoglobin greater than 7 -currently 8.0 -patient has received a total of 4 units packed red blood cells. -Continue with to monitor him ICU setting  -Continue to transfuse to keep above 7  -Hold all anticoagulation/antiplatelets/chemical DVT prophylaxis  -Consult general surgery -appreciate input -EGD at [facility] duodenal bulb ulcer clipped, Heema spray and epi  -Leukocytosis-27.8-likely reactive, patient does not appear toxic or acutely ill  -Monitor renal function given acute GI bleed  -General surgery on board, interventional radiology if needed    Medication for other comorbidities continue as appropriate dose adjustment as necessary.   Case was discussed with Dr. Govind Pearson prior to transfer to [facility]    DVT prophylaxis-PCD's  PT OT  Discharge planning           Code status: Full  Requires inpatient level of care    Jodi Barron MD  9:36 AM  11/11/2022

## 2022-11-11 NOTE — PLAN OF CARE
Problem: ABCDS Injury Assessment  Goal: Absence of physical injury  Outcome: Progressing     Problem: Discharge Planning  Goal: Discharge to home or other facility with appropriate resources  Outcome: Progressing  Flowsheets (Taken 11/10/2022 2000)  Discharge to home or other facility with appropriate resources: Identify barriers to discharge with patient and caregiver     Problem: Safety - Adult  Goal: Free from fall injury  Outcome: Progressing     Problem: Chronic Conditions and Co-morbidities  Goal: Patient's chronic conditions and co-morbidity symptoms are monitored and maintained or improved  Outcome: Progressing  Flowsheets (Taken 11/10/2022 2000)  Care Plan - Patient's Chronic Conditions and Co-Morbidity Symptoms are Monitored and Maintained or Improved: Monitor and assess patient's chronic conditions and comorbid symptoms for stability, deterioration, or improvement

## 2022-11-11 NOTE — CARE COORDINATION
11/11: Transition of care:  Pt presented to the [facility] ER for dizziness & lightheadedness. Pt's Hgb was 8.3 his baseline was 14.4  11/10 pt had EGD with hemorrhage control of duodenal ulcers. Pt was transferred to [facility] on 11/11 if pt required IR embolization. Pt was recently had a right hip arthroplasty on 10/25 & at the time was found to have a DVT in right leg. Pt was dc home on PO Eliquis. Pt is on room air at 94%, Iv Fluids & Iv Protonix. Pt is on a clear liquid diet & strict bedrest.  Cm met bedside with pt to discuss CM role & dc planning. Pt's PCP is Dr. Guadalupe Ku & uses the [address] [place] [address] on [place]. Pt lives with his wife in a ranch house with 3 steps to enter. Pt has an old walker at home & would like a new one on dc. Pt is active with [facility] for Nursing/PT/OT. Will need a resumption of care on dc. Will need PT/OT evals. Pt has no hx of SNF. Pt's dc plan is to return home & family can transport him. Sw/CM will continue to follow for dc planning. Electronically signed by Tal Dutta RN on 11/11/2022 at 2:33 PM    The Plan for Transition of Care is related to the following treatment goals: [facility]    The Patient and/or patient representative  was provided with a choice of provider and agrees   with the discharge plan. [x] Yes [] No    Freedom of choice list was provided with basic dialogue that supports the patient's individualized plan of care/goals, treatment preferences and shares the quality data associated with the providers.  [x] Yes [] No

## 2022-11-11 NOTE — PROGRESS NOTES
Patient admitted from ER to 217, with the following belongings blanket, cell phone, and earring, placed on monitor, patient oriented to room and unit visiting hours. Patient guide at bedside, reviewed patient rights and responsibilities. MRSA nasal swab obtained. Bed alarm on. Call light within reach.

## 2022-11-11 NOTE — PLAN OF CARE
Problem: Chronic Conditions and Co-morbidities  Goal: Patient's chronic conditions and co-morbidity symptoms are monitored and maintained or improved  11/11/2022 1220 by Elizabeth Coppola RN  Outcome: Progressing     Problem: Discharge Planning  Goal: Discharge to home or other facility with appropriate resources  11/11/2022 1220 by Elizabeth Coppola RN  Outcome: Progressing     Problem: Pain  Goal: Verbalizes/displays adequate comfort level or baseline comfort level  11/11/2022 1220 by Elizabeth Coppola RN  Outcome: Progressing     Problem: Safety - Adult  Goal: Free from fall injury  Outcome: Progressing     Problem: ABCDS Injury Assessment  Goal: Absence of physical injury  Outcome: Progressing

## 2022-11-11 NOTE — CONSULTS
GENERAL SURGERY  CONSULT NOTE  11/11/2022    Physician Consulted: Dr. Patricia Churchill   Reason for Consult: GIB    HPI  Suzie Plaza is a 60 y.o. male who was transferred from [facility] for GIB. He initially presented to [facility] with melena. He underwent EGD 11/10 with findings of visible bleeding duodenal bulb ulcer. At that time, epinephrine was injected, cautery, and a clip was placed, with hemospray. Hemostasis was obtained. At that time he was recommended transfer to [facility] for further monitoring and possible IR embolization. He states he recently underwent hip surgery 10/26 and developed at DVT and was started on Eliquis. He then developed melena and presented to the ED with hgb 8.3 from baseline of 14 1 week ago. Hgb now 8.8 from 5.9, s/p 4 units pRBC. No further transfusion required since transfer from [facility]. HR 11. BP 100s/60s. AOX4. No pressor requirements. TEG and INR pending       Past Medical History:   Diagnosis Date    Abdominal hernia     Arthritis     Bilateral knee pain     Diabetes mellitus ([address]     Diverticulitis     Hyperlipidemia     OKSANA on CPAP     Right hip pain        Past Surgical History:   Procedure Laterality Date    ABDOMEN SURGERY  1990'S    BOWEL SURGERY TO REPAIR RUPTURED COLON, BOWEL RESECTION WITH COLOSTOMY    ABDOMEN SURGERY  1990'S    REVERSAL OF COLOSTOMY    CATARACT EXTRACTION EXTRACAPSULAR W/ INTRAOCULAR LENS IMPLANTATION Bilateral     COLONOSCOPY      HERNIA REPAIR      STOMACH    KNEE ARTHROSCOPY      LEFT    MYRINGOTOMY  03/15/2012    right ear with nasopharyngoscopy with biopsy    TOTAL HIP ARTHROPLASTY Right 10/26/2022    ROBOTIC FERN ASSISTED RIGHT HIP TOTAL ARTHROPLASTY    +++NATANAEL+++ performed by Frannie Sanon MD at [address] [place] [address] LEFT EAR       Medications Prior to Admission:    Prior to Admission medications    Medication Sig Start Date End Date Taking?  Authorizing Provider   polyethylene glycol (MIRALAX) 17 g packet Take 17 g by mouth daily as needed for Constipation    Historical Provider, MD   apixaban starter pack (ELIQUIS DVT/PE STARTER PACK) 5 MG TBPK tablet Take 1 tablet by mouth See Admin Instructions 11/2/22   Indira Weinstein DO   dapagliflozin (FARXIGA) 5 MG tablet Take 5 mg by mouth every morning    Historical Provider, MD   metFORMIN (GLUCOPHAGE) 1000 MG tablet Take 1,000 mg by mouth 2 times daily (with meals)    Historical Provider, MD   atorvastatin (LIPITOR) 10 MG tablet Take 10 mg by mouth daily    Historical Provider, MD   etodolac (LODINE XL) 400 MG SR tablet Take 400 mg by mouth daily as needed. Historical Provider, MD       No Known Allergies    No family history on file. Social History     Tobacco Use    Smoking status: Never    Smokeless tobacco: Never   Vaping Use    Vaping Use: Never used   Substance Use Topics    Alcohol use: Yes     Comment: OCCASIONAL    Drug use: No         Review of Systems - pertinent ROS in HPI    PHYSICAL EXAM:    Vitals:    11/11/22 0200   BP: 115/68   Pulse: (!) 113   Resp: 24   SpO2: 90%       General Appearance:  awake, alert, oriented, in no acute distress  Skin:  Skin color, texture, turgor normal. No rashes or lesions. Head/face:  NCAT  Eyes:  pale conjunctiva   Lungs:  Normal expansion. Clear to auscultation. No rales, rhonchi, or wheezing. Heart:  tachycardia   Abdomen:  soft. Mild distention. Nontender to palpation   Extremities: pulses present in all extremities    LABS:    CBC  Recent Labs     11/11/22  0235   WBC 31.5*   HGB 8.8*   HCT 26.6*        BMP  Recent Labs     11/11/22  0235      K 3.6   *   CO2 20*   BUN 48*   CREATININE 1.0   CALCIUM 8.1*     Liver Function  Recent Labs     11/10/22  0158 11/11/22  0235   LIPASE 29  --    BILITOT 0.2 0.3   AST 13 13   ALT 19 15   ALKPHOS 35* 27*   PROT 5.3* 4.8*   LABALBU 3.1* 2.7*     No results for input(s): LACTATE in the last 72 hours.   Recent Labs     11/10/22  0840   INR 1.3       RADIOLOGY    No results found.      ASSESSMENT:  60 y.o. male with acute upper GIB s/p endoscopic control of hemorrhage 11/10    PLAN:  - continue to trend H/H, transfuse as indicated  - PPI BID  - monitor for signs of active bleeding   - if patient rebleeds will need IR intervention  - IVF  - Ok for clears     Electronically signed by Cathie Anderson MD on 11/11/22 at 4:41 AM EST

## 2022-11-11 NOTE — CONSULTS
[address]  Internal Medicine Residency Program  Consult  [facility]    Patient:  Pavan Askew. 60 y.o. male MRN: [id number]     Date of Service: 11/11/2022    Hospital Day: 1      Chief complaint: GI bleed    History of Present Illness   The patient is a 60 y.o. male with PMH of DM, OKSANA, arthritis and HLD presented to [facility] ED on 11/10/22 for lightheadedness and 2 episodes of dark, tarry stools. Patient was on Eliquis for a recent DVT found on 11/2/22 after a recent right total hip replacement that took place on 10/26/22. Patient went to ED at [facility] and was tachycardic and hypotensive at BP 98/56 with heart rate of 122. Patient had elevated blood glucose at 313, lactic acid of 6.7, WBC 27.8 and hemoglobin of 8.3. Labs were then repeated and he was found to have a hemoglobin of 5.9. General surgery was consulted for an emergent EGD and was found to have a posterior duodenal ulcer bulb bleed that was controlled with epinephrin, clip, cautery and hemospray. Patient is a high risk for rebleeding due to location of the ulcer and difficult visualization. Patient was then transferred to [facility] for monitoring. If there is a rebleed a stat IR consult for embolization would be necessary. A total of 4 units pRBC were transfused while at [facility] and patient arrived stable on room air with stable vital signs in no acute distress. Repeat labs done after arrival showed stable Hemglobin at 8.8 and lactic acid at 2.9 but WBC increased to 31.5. Blood cultures, procal and urinalysis ordered.             Past Medical History:      Diagnosis Date    Abdominal hernia     Arthritis     Bilateral knee pain     Diabetes mellitus ([address]     Diverticulitis     Hyperlipidemia     OKSANA on CPAP     Right hip pain        Past Surgical History:        Procedure Laterality Date    ABDOMEN SURGERY  1990'S    BOWEL SURGERY TO REPAIR RUPTURED COLON, BOWEL RESECTION WITH COLOSTOMY    ABDOMEN SURGERY 1990'S    REVERSAL OF COLOSTOMY    CATARACT EXTRACTION EXTRACAPSULAR W/ INTRAOCULAR LENS IMPLANTATION Bilateral     COLONOSCOPY      HERNIA REPAIR      STOMACH    KNEE ARTHROSCOPY      LEFT    MYRINGOTOMY  03/15/2012    right ear with nasopharyngoscopy with biopsy    TOTAL HIP ARTHROPLASTY Right 10/26/2022    ROBOTIC FERN ASSISTED RIGHT HIP TOTAL ARTHROPLASTY    +++NATANAEL+++ performed by Michael Antony MD at [address]  LEFT EAR       Medications Prior to Admission:    Prior to Admission medications    Medication Sig Start Date End Date Taking? Authorizing Provider   polyethylene glycol (MIRALAX) 17 g packet Take 17 g by mouth daily as needed for Constipation    Historical Provider, MD   apixaban starter pack (ELIQUIS DVT/PE STARTER PACK) 5 MG TBPK tablet Take 1 tablet by mouth See Admin Instructions 11/2/22   Niko Weinstein DO   dapagliflozin (FARXIGA) 5 MG tablet Take 5 mg by mouth every morning    Historical Provider, MD   metFORMIN (GLUCOPHAGE) 1000 MG tablet Take 1,000 mg by mouth 2 times daily (with meals)    Historical Provider, MD   atorvastatin (LIPITOR) 10 MG tablet Take 10 mg by mouth daily    Historical Provider, MD   etodolac (LODINE XL) 400 MG SR tablet Take 400 mg by mouth daily as needed. Historical Provider, MD       Allergies:  Patient has no known allergies. Social History:   TOBACCO:   reports that he has never smoked. He has never used smokeless tobacco.  ETOH:   reports current alcohol use. Family History:   No family history on file. REVIEW OF SYSTEMS:    Constitutional: No fever, no chills, no change in weight; good appetite  HEENT: No blurred vision, no ear problems, no sore throat, no rhinorrhea.   Respiratory: No cough, no sputum production, no pleuritic chest pain, no shortness of breath  Cardiology: No angina, no dyspnea on exertion, no paroxysmal nocturnal dyspnea, no orthopnea, no palpitation, slight leg swelling on right leg (recent DVT).   Gastroenterology: No dysphagia, no reflux; no abdominal pain, no nausea or vomiting; no constipation or diarrhea.  No hematochezia; + melena    Genitourinary: No dysuria, no frequency, hesitancy; no hematuria  Musculoskeletal: + joint pain (chronic), no myalgia, no change in range of movement  Neurology: no focal weakness in extremities, no slurred speech, no double vision, no tingling or numbness sensation  Endocrinology: no temperature intolerance, no polyphagia, polydipsia or polyuria  Hematology: no increased bleeding, no bruising, no lymphadenopathy  Skin: no skin changes noticed by patient  Psychology: no depressed mood, no suicidal ideation    Physical Exam   Vitals: /68   Pulse (!) 113   Resp 24   SpO2 90%           General Appearance: alert and oriented to person, place and time, well developed and well- nourished, in no acute distress  Skin: warm and dry, no rash or erythema  Head: normocephalic and atraumatic  Neck: supple and non-tender without mass, no thyromegaly or thyroid nodules, no cervical lymphadenopathy  Pulmonary/Chest: clear to auscultation bilaterally- no wheezes, rales or rhonchi, normal air movement, no respiratory distress  Cardiovascular: normal rate, regular rhythm, normal S1 and S2, no murmurs, rubs, clicks, or gallops, distal pulses intact, no carotid bruits  Abdomen: soft, non-tender, non-distended, normal bowel sounds, no masses or organomegaly; reducible abdominal hernias   Extremities: no cyanosis, clubbing; edema in R lower extremity (recent DVT)   Musculoskeletal: normal range of motion, no joint swelling, deformity or tenderness  Neurologic: coordination and speech normal; appropriate thought content      Lines     site day    Art line   None    TLC L Fem  11/10 - removed on 11/11   PICC None    Hemoaccess None    Oxygen:    Room air SpO2 94%    ABG:   No results found for: PHART, PH, PAX8ZPR, PCO2, PO2ART, PO2, LLH1ZKS, HCO3, BEART, BE, THGBART, THB, NFG4NPR, X6WAYGBT, O2SAT  Labs and Imaging Studies   Basic Labs  CBC:   Lab Results   Component Value Date/Time    WBC 31.5 11/11/2022 02:35 AM    RBC 2.96 11/11/2022 02:35 AM    HGB 8.8 11/11/2022 02:35 AM    HCT 26.6 11/11/2022 02:35 AM    MCV 89.9 11/11/2022 02:35 AM    RDW 17.2 11/11/2022 02:35 AM     11/11/2022 02:35 AM     BMP:    Lab Results   Component Value Date/Time     11/11/2022 02:35 AM    K 3.6 11/11/2022 02:35 AM     11/11/2022 02:35 AM    CO2 20 11/11/2022 02:35 AM    BUN 48 11/11/2022 02:35 AM       Imaging Studies:     XR CHEST PORTABLE    Result Date: 11/10/2022  EXAMINATION: ONE XRAY VIEW OF THE CHEST 11/10/2022 6:05 am COMPARISON: None. HISTORY: ORDERING SYSTEM PROVIDED HISTORY: hypotension TECHNOLOGIST PROVIDED HISTORY: Reason for exam:->hypotension FINDINGS: The lungs are without acute focal process. There is no effusion or pneumothorax. The cardiomediastinal silhouette is without acute process. The osseous structures are without acute process. No acute process. CTA CHEST W CONTRAST    Result Date: 11/10/2022  EXAMINATION: CTA OF THE CHEST 11/10/2022 11:40 am TECHNIQUE: CTA of the chest was performed after the administration of intravenous contrast.  Multiplanar reformatted images are provided for review. MIP images are provided for review. Automated exposure control, iterative reconstruction, and/or weight based adjustment of the mA/kV was utilized to reduce the radiation dose to as low as reasonably achievable. COMPARISON: None. HISTORY: ORDERING SYSTEM PROVIDED HISTORY: PE TECHNOLOGIST PROVIDED HISTORY: Reason for exam:->PE FINDINGS: IV contrast was administered through the femoral approach, it opacifies the inferior vena cava. There is no indication for acute pulmonary embolus in the main PA, right and left main PAs, in the lobar, segmental and subsegmental branches to the 3/4 order. There is no aneurysm formation or dissection of the thoracic aorta.  Partially calcified atheromatous changes are seen mainly in the arch of the aorta with small mild sessile plaque formation in the inferior aspect of the posterior arch of the aorta. Heart is normal size. The calcifications are seen in the coronary arteries. There is no pericardial effusion. In the mediastinum there are few borderline size lymph nodes. Once located in the pretracheal region measuring 9 mm with central fat sinus. There is another lymph node  in the pretracheal carinal region measuring 13 x 8 mm. There few lymph nodes anterior to the right and left main bronchus and in the infracarinal region where it measures 13 x 6 mm. There is a small lymph node in the aortopulmonary window measuring 6 mm with central fat renal sinus. Lungs are normally expanded. No infiltrates, consolidates or pleural effusions are seen. Upper abdominal structures not fully covered on the present study. A small gallstone of a 8 mm seen in the dependent portion of the gallbladder. The pancreas and the liver not fully covered on this study but there is no indication for dilatation of the pancreatic ductal system or for the biliary tract. There is thickening of the inferior wall of the celiac axis for a length of a 2.7 cm causes significant encroachment of the central lumen of the vessel with anatomic stenosis in the range of 70% indicating severe anatomic stenosis of the cross-sectional lumen of the proximal segment the SMA. This could be manifestation of intramural hemorrhage or hematoma previously or manifestation of vasculitis. The due the severe encroachment of the central lumen of the proximal SMA consider vascular surgery consultation. There is a ventral a shin of the midline anterior abdominal wall Degenerative changes are seen in the mid lower thoracic spine segments and also seen in the lower cervical spine in the level C6-7.  Anterior wedging deformity of the lower thoracic vertebral bodies cause increased dorsal lower thoracic kyphosis are old findings more likely. 1.  No acute pulmonary emboli. 2.  No aneurysm formation or dissection of the thoracic aorta. 3.  Calcifications of the coronary arteries. 4.  No acute pulmonary process. 5.  Borderline mediastinal lymph nodes. The can be followed in a time interval 6 months for better baseline determination. 6.  Significant eccentric thickening of the wall of the proximal segment of the SMA with the severe anatomic stenosis in range of 70%. See above comments. Consultation with vascular surgery recommended the. RECOMMENDATIONS: Unavailable     US DUP LOWER EXTREMITY RIGHT LAKESHA    Result Date: 11/2/2022  EXAMINATION: DUPLEX VENOUS ULTRASOUND OF THE RIGHT LOWER EXTREMITY 11/2/2022 11:43 am TECHNIQUE: Duplex ultrasound using B-mode/gray scaled imaging and Doppler spectral analysis and color flow was obtained of the deep venous structures of the right lower extremity. COMPARISON: None. HISTORY: ORDERING SYSTEM PROVIDED HISTORY: Pain of right lower leg TECHNOLOGIST PROVIDED HISTORY: What reading provider will be dictating this exam?->CRC FINDINGS: There is thrombus in the peroneal veins. The remaining visualized veins of the right lower extremity are patent and free of echogenic thrombus. The veins demonstrate good compressibility with normal color flow study and spectral analysis. DVT, peroneal vein. Critical results were called by Dr. Yaakov Barron to Giuliano Marrufo LPN on 50/3/7300 at 41:36 p.m. RECOMMENDATIONS: Unavailable     US DUP LOWER EXTREMITIES BILATERAL VENOUS    Result Date: 11/10/2022  EXAMINATION: DUPLEX VENOUS ULTRASOUND OF THE BILATERAL LOWER [id number]/10/2022 12:12 pm TECHNIQUE: Duplex ultrasound using B-mode/gray scaled imaging, Doppler spectral analysis and color flow Doppler was obtained of the deep venous structures of the lower bilateral extremities. COMPARISON: None.  HISTORY: ORDERING SYSTEM PROVIDED HISTORY: dvt TECHNOLOGIST PROVIDED HISTORY: Reason for exam:->dvt What reading provider will be dictating this exam?->CRC FINDINGS: The visualized veins of the bilateral lower extremities are patent and free of echogenic thrombus. The veins demonstrate good compressibility with normal color flow study and spectral analysis. No evidence of DVT in either lower extremity. RECOMMENDATIONS: Unavailable     XR HIP 2-3 VW W PELVIS RIGHT    Result Date: 10/26/2022  EXAMINATION: ONE XRAY VIEW OF THE PELVIS AND TWO XRAY VIEWS RIGHT HIP 10/26/2022 8:52 am COMPARISON: CT right hip 10 October 2022. HISTORY: ORDERING SYSTEM PROVIDED HISTORY: Post op TECHNOLOGIST PROVIDED HISTORY: Of operative side while in recovery room Reason for exam:->Post op FINDINGS: Anatomically aligned right TH a with no abnormal bone or soft tissue findings. There is a likely abdominal wall hernia containing loops of bowel. Right FRANCESCA with no unexpected findings. Likely abdominal wall hernia containing loops of bowel. Resident's Assessment and Plan     Aidan Seeds. is a 60 y.o. male who presents from [facility] facility after emergent EGD and was found to up posterior duodenal ulcer that was controlled. Patient was transferred due to risk of rebleed and need for possible IR embolization. Neurology: 1. Alert and Oriented X3  Cardiology:   1.hx Hyperlipidemia    -On Atorvastatin 10 mg at home     2. Recent DVT    -developed DVT s/p R hip arthroplasty    -Started on Eliquis and presented with dark, tarry stools    -Eliquis on hold   -repeat doppler showed no clot    -Will follow up doppler in 2 weeks as outpatient     3. Elevated Troponins    -Troponin 32 then 27 on admission    -repeat level and follow results     Gastroenterology: 1. Upper GI Bleed s/p EGD cauterization and transfused 4 units pRBCs  - Recently started on Eliquis for DVT (1 week) and 2 melanotic stools  -Dizzy and lightheaded and hypotensive and tachycardic on admission  -General surgery consulted for emergent EGD   -Found to have posterior duodenal bulb ulcer - controlled with cautery, clip ,hemospray and epi   -[facility] for [facility]bleed [facility] if needed   -Hgb on transfer arrival 8.8  -transfuse if Hgb < 7  -CBC Q6; monitor vital signs and CBC daily   -Hgb 8 today         ID: 1. Lactic acidosis    -LA 6.7 on admission at [facility] ED    -LA 2.9  -monitor lactic acid Q6 until < 2     2. Leukocytosis    -WBC 27.8 on admission   -WBC after transfer and procedure 31.5    -Procal 0.18 on admission    -repeat procal   -order UA and blood cultures - follow results     Endocrine: 1. Hx T2DM    -On metformin 1000mg BID and dapagliflozin 5 mg QD at home   -low dose SS and POCT glucose Q6H     Musculoskeletal:   1. Hx R hip arthroplasty on 10/26/22         Code Status:   Full        PT/OT evaluation: no indicated at this time   DVT prophylaxis/ GI prophylaxis: On Hold and PCD/ Protonix BID   [facility]    Hema Humphries MD, PGY-1   Attending physician: Dr. Blair

## 2022-11-12 LAB
ALBUMIN SERPL-MCNC: 2.6 G/DL (ref 3.5–5.2)
ALP BLD-CCNC: 29 U/L (ref 40–129)
ALT SERPL-CCNC: 17 U/L (ref 0–40)
ANGLE (CLOT STRENGTH): 74.7 DEGREE (ref 59–74)
ANION GAP SERPL CALCULATED.3IONS-SCNC: 7 MMOL/L (ref 7–16)
ANISOCYTOSIS: ABNORMAL
AST SERPL-CCNC: 16 U/L (ref 0–39)
BASOPHILS ABSOLUTE: 0.18 E9/L (ref 0–0.2)
BASOPHILS RELATIVE PERCENT: 0.9 % (ref 0–2)
BILIRUB SERPL-MCNC: 0.3 MG/DL (ref 0–1.2)
BUN BLDV-MCNC: 25 MG/DL (ref 6–23)
CALCIUM IONIZED: 1.18 MMOL/L (ref 1.15–1.33)
CALCIUM SERPL-MCNC: 7.6 MG/DL (ref 8.6–10.2)
CHLORIDE BLD-SCNC: 112 MMOL/L (ref 98–107)
CO2: 23 MMOL/L (ref 22–29)
CREAT SERPL-MCNC: 0.9 MG/DL (ref 0.7–1.2)
EOSINOPHILS ABSOLUTE: 0 E9/L (ref 0.05–0.5)
EOSINOPHILS RELATIVE PERCENT: 0.6 % (ref 0–6)
EPL-TEG: 0 % (ref 0–15)
G-TEG: 10.2 K D/SC (ref 4.5–11)
GFR SERPL CREATININE-BSD FRML MDRD: >60 ML/MIN/1.73
GLUCOSE BLD-MCNC: 136 MG/DL (ref 74–99)
HCT VFR BLD CALC: 20.1 % (ref 37–54)
HCT VFR BLD CALC: 23.5 % (ref 37–54)
HCT VFR BLD CALC: 24.5 % (ref 37–54)
HCT VFR BLD CALC: 24.7 % (ref 37–54)
HEMOGLOBIN: 6.5 G/DL (ref 12.5–16.5)
HEMOGLOBIN: 7.5 G/DL (ref 12.5–16.5)
HEMOGLOBIN: 7.8 G/DL (ref 12.5–16.5)
HEMOGLOBIN: 7.9 G/DL (ref 12.5–16.5)
INR BLD: 1.3
K (CLOTTING TIME): 1 MIN (ref 1–3)
LACTIC ACID: 1.6 MMOL/L (ref 0.5–2.2)
LY30 (FIBRINOLYSIS): 0 % (ref 0–8)
LYMPHOCYTES ABSOLUTE: 1.76 E9/L (ref 1.5–4)
LYMPHOCYTES RELATIVE PERCENT: 8.8 % (ref 20–42)
MA (MAX AMPLITUDE): 67.2 MM (ref 50–70)
MAGNESIUM: 2 MG/DL (ref 1.6–2.6)
MCH RBC QN AUTO: 29.6 PG (ref 26–35)
MCHC RBC AUTO-ENTMCNC: 31.9 % (ref 32–34.5)
MCV RBC AUTO: 92.9 FL (ref 80–99.9)
METER GLUCOSE: 133 MG/DL (ref 74–99)
METER GLUCOSE: 149 MG/DL (ref 74–99)
METER GLUCOSE: 165 MG/DL (ref 74–99)
METER GLUCOSE: 172 MG/DL (ref 74–99)
MONOCYTES ABSOLUTE: 0.78 E9/L (ref 0.1–0.95)
MONOCYTES RELATIVE PERCENT: 4.4 % (ref 2–12)
NEUTROPHILS ABSOLUTE: 16.86 E9/L (ref 1.8–7.3)
NEUTROPHILS RELATIVE PERCENT: 85.9 % (ref 43–80)
OVALOCYTES: ABNORMAL
PDW BLD-RTO: 17.6 FL (ref 11.5–15)
PHOSPHORUS: 2.3 MG/DL (ref 2.5–4.5)
PLATELET # BLD: 201 E9/L (ref 130–450)
PMV BLD AUTO: 9.9 FL (ref 7–12)
POIKILOCYTES: ABNORMAL
POLYCHROMASIA: ABNORMAL
POTASSIUM REFLEX MAGNESIUM: 3.4 MMOL/L (ref 3.5–5)
PROTHROMBIN TIME: 13.7 SEC (ref 9.3–12.4)
R (REACTION TIME): 2.6 MIN (ref 5–10)
RBC # BLD: 2.53 E12/L (ref 3.8–5.8)
SODIUM BLD-SCNC: 142 MMOL/L (ref 132–146)
TOTAL PROTEIN: 4.4 G/DL (ref 6.4–8.3)
URINE CULTURE, ROUTINE: NORMAL
WBC # BLD: 19.6 E9/L (ref 4.5–11.5)

## 2022-11-12 PROCEDURE — 85018 HEMOGLOBIN: CPT

## 2022-11-12 PROCEDURE — 99291 CRITICAL CARE FIRST HOUR: CPT | Performed by: INTERNAL MEDICINE

## 2022-11-12 PROCEDURE — 2700000000 HC OXYGEN THERAPY PER DAY

## 2022-11-12 PROCEDURE — 2000000000 HC ICU R&B

## 2022-11-12 PROCEDURE — 82330 ASSAY OF CALCIUM: CPT

## 2022-11-12 PROCEDURE — 6370000000 HC RX 637 (ALT 250 FOR IP)

## 2022-11-12 PROCEDURE — 2580000003 HC RX 258

## 2022-11-12 PROCEDURE — 80053 COMPREHEN METABOLIC PANEL: CPT

## 2022-11-12 PROCEDURE — 85025 COMPLETE CBC W/AUTO DIFF WBC: CPT

## 2022-11-12 PROCEDURE — 82962 GLUCOSE BLOOD TEST: CPT

## 2022-11-12 PROCEDURE — 85014 HEMATOCRIT: CPT

## 2022-11-12 PROCEDURE — 2500000003 HC RX 250 WO HCPCS

## 2022-11-12 PROCEDURE — 83735 ASSAY OF MAGNESIUM: CPT

## 2022-11-12 PROCEDURE — 2580000003 HC RX 258: Performed by: INTERNAL MEDICINE

## 2022-11-12 PROCEDURE — A4216 STERILE WATER/SALINE, 10 ML: HCPCS | Performed by: INTERNAL MEDICINE

## 2022-11-12 PROCEDURE — C9113 INJ PANTOPRAZOLE SODIUM, VIA: HCPCS | Performed by: INTERNAL MEDICINE

## 2022-11-12 PROCEDURE — 36430 TRANSFUSION BLD/BLD COMPNT: CPT

## 2022-11-12 PROCEDURE — 85384 FIBRINOGEN ACTIVITY: CPT

## 2022-11-12 PROCEDURE — 36415 COLL VENOUS BLD VENIPUNCTURE: CPT

## 2022-11-12 PROCEDURE — 85576 BLOOD PLATELET AGGREGATION: CPT

## 2022-11-12 PROCEDURE — 85347 COAGULATION TIME ACTIVATED: CPT

## 2022-11-12 PROCEDURE — 84100 ASSAY OF PHOSPHORUS: CPT

## 2022-11-12 PROCEDURE — 6360000002 HC RX W HCPCS: Performed by: INTERNAL MEDICINE

## 2022-11-12 PROCEDURE — 85610 PROTHROMBIN TIME: CPT

## 2022-11-12 RX ORDER — SODIUM CHLORIDE 9 MG/ML
INJECTION, SOLUTION INTRAVENOUS PRN
Status: DISCONTINUED | OUTPATIENT
Start: 2022-11-12 | End: 2022-11-13

## 2022-11-12 RX ORDER — POTASSIUM CHLORIDE 20 MEQ/1
40 TABLET, EXTENDED RELEASE ORAL ONCE
Status: COMPLETED | OUTPATIENT
Start: 2022-11-12 | End: 2022-11-12

## 2022-11-12 RX ADMIN — SODIUM CHLORIDE, PRESERVATIVE FREE 40 MG: 5 INJECTION INTRAVENOUS at 16:42

## 2022-11-12 RX ADMIN — Medication 5 ML: at 10:55

## 2022-11-12 RX ADMIN — INSULIN LISPRO 1 UNITS: 100 INJECTION, SOLUTION INTRAVENOUS; SUBCUTANEOUS at 13:29

## 2022-11-12 RX ADMIN — Medication 10 ML: at 21:14

## 2022-11-12 RX ADMIN — POTASSIUM PHOSPHATE, MONOBASIC AND POTASSIUM PHOSPHATE, DIBASIC 15 MMOL: 224; 236 INJECTION, SOLUTION, CONCENTRATE INTRAVENOUS at 09:42

## 2022-11-12 RX ADMIN — SODIUM CHLORIDE, PRESERVATIVE FREE 40 MG: 5 INJECTION INTRAVENOUS at 03:17

## 2022-11-12 RX ADMIN — SODIUM CHLORIDE, POTASSIUM CHLORIDE, SODIUM LACTATE AND CALCIUM CHLORIDE: 600; 310; 30; 20 INJECTION, SOLUTION INTRAVENOUS at 15:11

## 2022-11-12 RX ADMIN — POTASSIUM CHLORIDE 40 MEQ: 1500 TABLET, EXTENDED RELEASE ORAL at 08:34

## 2022-11-12 NOTE — PROGRESS NOTES
GENERAL SURGERY  DAILY PROGRESS NOTE  11/12/2022  No chief complaint on file. CC: GI bleed    Subjective:  Patient feels much better. No nausea/vomiting/abdominal pain. Tolerated clear liquid diet. Passing gas. Did not have BM. Received 1 unit of pRBCs yesterday. HgB 7.5 from 6.5. Objective:  BP (!) 132/53   Pulse 74   Temp 98.1 °F (36.7 °C) (Temporal)   Resp 12   Wt 250 lb (113.4 kg)   SpO2 96%   BMI 39.16 kg/m²     General appearance: alert, cooperative and in no acute distress. Eyes: grossly normal  Lungs: nonlabored breathing on 2 L O2 nasal cannula. Heart: regular rate, normotensive  Abdomen: soft, non-tender, non distended  Skin: No skin abnormalities  Neurologic: Alert and oriented x 3. Grossly normal  Musculoskeletal: No clubbing cyanosis or edema    Assessment/Plan:  60 y.o. male with acute upper GIB s/p endoscopic control of hemorrhage 11/10    - continue to trend H/H, transfuse as indicated  - PPI BID  - monitor for signs of active bleeding   - if patient rebleeds will need IR intervention  - IVF  - Ok for peptic ulcer diet from surgery POV. Electronically signed by Mian Rodas MD on 11/12/2022 at 8:29 AM      Pt seen and examined; agree w above  Apparently had one small dark bm overnight; nothing since  +flatus  Plan as outlined above    Neli Segovia MD FACS  Minimally Invasive General Surgery and Endoscopy  [address] [place] [address].  [address]: [phone]  F: [phone]      Electronically signed by Jeanne Odom MD on 11/12/2022 at 2:16 PM

## 2022-11-12 NOTE — PROGRESS NOTES
[address] [facility]  Department of Internal Medicine   Internal Medicine Residency   MICU Progress Note    Patient:  Link Roche. 60 y.o. male  MRN: [id number]     Date of Service: 2022    Allergy: Patient has no known allergies. Subjective   This morning patient was seen and examined at bedside in no acute distress. He reports that he slept well overnight, however, he reports 1 episode of tarry stool overnight. He said that it was a small amount, currently denies shortness of breath, chest pain, dizziness, lightheadedness, headache, nausea or vomiting. 24h change: 1 episode of tarry stool, hemoglobin 6.5, 1 unit of PRBC given  ROS: Denies Fever/chills/CP/SOB/N/V/D/C/Dysuria. Objective     VITAL SIGNS:  BP (!) 132/53   Pulse 74   Temp 98.1 °F (36.7 °C) (Temporal)   Resp 12   Wt 250 lb (113.4 kg)   SpO2 96%   BMI 39.16 kg/m²   Tmax over 24 hours:  Temp (24hrs), Av.4 °F (36.9 °C), Min:97.9 °F (36.6 °C), Max:99 °F (37.2 °C)      Patient Vitals for the past 6 hrs:   BP Temp Temp src Pulse Resp SpO2 Weight   22 0600 (!) 132/53 -- -- 74 12 96 % --   22 0536 -- -- -- -- -- -- 250 lb (113.4 kg)   22 0500 116/69 98.1 °F (36.7 °C) Temporal 95 12 94 % --   22 0400 (!) 104/59 -- -- 81 14 96 % --         Intake/Output Summary (Last 24 hours) at 2022 0906  Last data filed at 2022 0500  Gross per 24 hour   Intake 2512.22 ml   Output 1050 ml   Net 1462.22 ml     Wt Readings from Last 2 Encounters:   22 250 lb (113.4 kg)   11/10/22 260 lb 2.3 oz (118 kg)     Body mass index is 39.16 kg/m².         I & O - 24hr:   Intake/Output Summary (Last 24 hours) at 2022 0906  Last data filed at 2022 0500  Gross per 24 hour   Intake 2512.22 ml   Output 1050 ml   Net 1462.22 ml       Physical Exam:  General Appearance: alert, appears stated age, and cooperative  Neck: no adenopathy, no carotid bruit, no JVD, supple, symmetrical, trachea midline, and thyroid not enlarged, symmetric, no tenderness/mass/nodules  Lung: clear to auscultation bilaterally  Heart: regular rate and rhythm, S1, S2 normal, no murmur, click, rub or gallop  Abdomen: soft, non-tender; bowel sounds normal; no masses,  no organomegaly, abdominal hernia present   Extremities:  Pitting edema in the right lower extremity   Musculoskeletal: No joint swelling, no muscle tenderness. ROM normal in all joints of extremities. Neurologic: Mental status: Alert, oriented, thought content appropriate    Lines     site day    Art line   None    TLC None    PICC None    Hemoaccess Fistula/Graft      VENT SETTINGS (Comprehensive) (if applicable): Additional Respiratory Assessments  Heart Rate: 74  Resp: 12  SpO2: 96 %    ABGs:   No results for input(s): PH, PCO2, PO2, HCO3, BE, O2SAT in the last 72 hours. Laboratory findings:  Complete Blood Count:   Recent Labs     11/10/22  0840 11/10/22  1600 11/11/22  0235 11/11/22  0911 11/11/22  1824 11/11/22  2310 11/12/22  0435   WBC 21.7*  --  31.5*  --   --   --  19.6*   HGB 8.0*   < > 8.8*   < > 7.1* 6.5* 7.5*   HCT 24.5*   < > 26.6*   < > 22.2* 20.1* 23.5*     --  285  --   --   --  201    < > = values in this interval not displayed.         Last 3 Blood Glucose:   Recent Labs     11/10/22  0840 11/11/22 0235 11/12/22  0435   GLUCOSE 227* 237* 136*        PT/INR:    Lab Results   Component Value Date/Time    PROTIME 15.5 11/11/2022 05:24 AM    INR 1.4 11/11/2022 05:24 AM     PTT:    Lab Results   Component Value Date/Time    APTT 29.1 11/02/2022 04:37 PM       Comprehensive Metabolic Profile:   Recent Labs     11/10/22  0840 11/11/22 0235 11/12/22  0435    146 142   K 4.2 3.6 3.4*   * 115* 112*   CO2 20* 20* 23   BUN 46* 48* 25*   CREATININE 0.9 1.0 0.9   GLUCOSE 227* 237* 136*   CALCIUM 8.4* 8.1* 7.6*   PROT  --  4.8* 4.4*   LABALBU  --  2.7* 2.6*   BILITOT  --  0.3 0.3   ALKPHOS  --  27* 29*   AST  --  13 16   ALT  --  15 17      Magnesium: Lab Results   Component Value Date/Time    MG 2.0 11/12/2022 04:35 AM     Phosphorus:   Lab Results   Component Value Date/Time    PHOS 2.3 11/12/2022 04:35 AM     Ionized Calcium: No results found for: CAION   Troponin: No results for input(s): TROPONINI in the last 72 hours. Urinalysis: Urine dipstick shows no significant abnormality     Medications     Infusions: (Fluid, Sedation, Vasopressors)  Vasopressors  None     Sedation  None    Nutrition: Adult clear, chewable, peptic ulcer diet      ATB:   Antibiotics  Days   None            Cultures: Pending        Imaging     XR CHEST PORTABLE    Result Date: 11/10/2022  No acute process. CTA CHEST W CONTRAST    Result Date: 11/10/2022  1. No acute pulmonary emboli. 2.  No aneurysm formation or dissection of the thoracic aorta. 3.  Calcifications of the coronary arteries. 4.  No acute pulmonary process. 5.  Borderline mediastinal lymph nodes. The can be followed in a time interval 6 months for better baseline determination. 6.  Significant eccentric thickening of the wall of the proximal segment of the SMA with the severe anatomic stenosis in range of 70%. See above comments. Consultation with vascular surgery recommended the. RECOMMENDATIONS: Unavailable     US DUP LOWER EXTREMITIES BILATERAL VENOUS    Result Date: 11/10/2022  No evidence of DVT in either lower extremity. RECOMMENDATIONS: Unavailable        Resident's Assessment and Plan     Mabelbraulio Perez   , 60 y.o. , male came with CC : GI bleed s/p DVT on Eliquis      has a past medical history of Abdominal hernia, Arthritis, Bilateral knee pain, Diabetes mellitus ([address] Diverticulitis, Hyperlipidemia, OKSANA on CPAP, and Right hip pain.        Days since Admission: 11/11/2022  Days on Ventilator : 0    Consults:   General surgery     Assessment:  Cardiology  Hx HLD  On atorvastatin 10 mg at home      Recent DVT  DVT developed s/p R hip arthroplasty  Started on Eliquis and presented with dark tarry stools  Eliquis held  Repeat Doppler showed no clot  Follow-up Doppler in 2 weeks as outpatient    Elevated troponins  Troponin 32, then 27  Repeat levels    Gastroenterology  Upper GI bleed s/p EGD cauterization  Transfused 4 units of PRBCs, in addition to 1 unit overnight  Recently started on Eliquis 1 week ago  Developed to black tarry stools episodes  Dizzy, lightheadedness, hypotensive and tachycardia resolved  EGD after consult by general surgery showed posterior duodenal bulb ulcer controlled with cautery clip and hemopray  Hgb 6.5 overnight, 1 unit given     Plan  Follow H&H Q8  Transfuse if hemoglobin less than 7  Hemoglobin overnight 6.5, 1 unit given, follow H&H posttransfusion  Repeat teg and PT/INR  Continue PPI twice daily  Monitor vital signs for possible decompensation  Currently on peptic ulcer diet  General surgery is following and currently recommends no further intervention  If there is any further bleeding episode, patient will need IR intervention    Lactic acidosis -resolved    Leukocytosis likely 2/2 reactive VS infection  Patient does not show any sign of infection or decompensation  Urinalysis unremarkable  WBC 19.6 trending down  Pro-Taye on admission 0.18  Follow blood cultures  Temperature stable    Endocrine  Hx of type 2 diabetes  On metformin 1000 mg twice daily and dapagliflozin 5 mg daily at home  Low-dose sliding scale and POCT glucose every 6 hours  Currently well controlled      MSK  Hx of R hip arthroplasty on 10/26/2022  Currently no complaint of pain      Problems resolved during this admission:   Lactic acidosis     PT/OT: Not indicated at the moment  DVT ppx: Compression stockings   GI ppx: Protonix       Code Status:   Full code     Disposition: Continue current care    Stefania Bellamy MD, PGY-1    Attending physician: Dr. Jamal Parikh    NOTE: This report was transcribed using voice recognition software.  Every effort was made to ensure accuracy; however, inadvertent computerized transcription errors may be present. [place]  Department of Pulmonary, Critical Care and Sleep Medicine  [address] [facility]  Department of Internal Medicine      During multidisciplinary team rounds Kalia Moore is a 60 y.o. male was seen, examined and discussed. This is confirmation that I have personally seen and examined the patient and that the key elements of the encounter were performed by me (> 85 % time). The medications & laboratory data was discussed and adjusted where necessary. The radiographic images were reviewed or with radiologist or consultant if felt dis-concordant with the exam or history. The above findings were corroborated, plans confirmed and changes made if needed. Family is updated at the bedside as available. Key issues of the case were discussed among consultants. Critical Care time is documented if appropriate.       Critical Care time: 33 minutes    Janeth Moscoso DO

## 2022-11-12 NOTE — PLAN OF CARE
Problem: Chronic Conditions and Co-morbidities  Goal: Patient's chronic conditions and co-morbidity symptoms are monitored and maintained or improved  11/12/2022 1102 by Nicole Otto RN  Outcome: Progressing     Problem: Discharge Planning  Goal: Discharge to home or other facility with appropriate resources  11/12/2022 1102 by Nicole Otto RN  Outcome: Progressing     Problem: Pain  Goal: Verbalizes/displays adequate comfort level or baseline comfort level  11/12/2022 1102 by Nicole Otto RN  Outcome: Progressing     Problem: Safety - Adult  Goal: Free from fall injury  11/12/2022 1102 by Nicole Otto RN  Outcome: Progressing     Problem: ABCDS Injury Assessment  Goal: Absence of physical injury  11/12/2022 1102 by Nicole Otto RN  Outcome: Progressing

## 2022-11-12 NOTE — PLAN OF CARE
Problem: Chronic Conditions and Co-morbidities  Goal: Patient's chronic conditions and co-morbidity symptoms are monitored and maintained or improved  Outcome: Progressing  Flowsheets  Taken 11/12/2022 0400  Care Plan - Patient's Chronic Conditions and Co-Morbidity Symptoms are Monitored and Maintained or Improved: Monitor and assess patient's chronic conditions and comorbid symptoms for stability, deterioration, or improvement  Taken 11/11/2022 2000  Care Plan - Patient's Chronic Conditions and Co-Morbidity Symptoms are Monitored and Maintained or Improved: Monitor and assess patient's chronic conditions and comorbid symptoms for stability, deterioration, or improvement     Problem: Discharge Planning  Goal: Discharge to home or other facility with appropriate resources  Outcome: Progressing     Problem: Pain  Goal: Verbalizes/displays adequate comfort level or baseline comfort level  Outcome: Progressing     Problem: Safety - Adult  Goal: Free from fall injury  Outcome: Progressing     Problem: ABCDS Injury Assessment  Goal: Absence of physical injury  Outcome: Progressing

## 2022-11-12 NOTE — PROGRESS NOTES
[facility]   Progress note      Subjective: The patient is awake and alert. Resting comfortably  Family at bedside  No acute complaints    Objective:    /64   Pulse 82   Temp 98.5 °F (36.9 °C) (Temporal)   Resp 28   Wt 250 lb (113.4 kg)   SpO2 96%   BMI 39.16 kg/m²     In: 2512.2 [P.O.:100; I.V.:2004.7; Blood:407.5]  Out: 1450   In: 2512.2   Out: 1450 [Urine:1450]    General appearance: NAD, conversant  HEENT: AT/NC, MMM  Neck: FROM, supple  Lungs: Clear to auscultation  CV: RRR, no MRGs  Vasc: Radial pulses 2+  Abdomen: Soft, non-tender; no masses or HSM  Extremities: No peripheral edema or digital cyanosis  Skin: no rash, lesions or ulcers  Psych: Alert and oriented to person, place and time  Neuro: Alert and interactive     Recent Labs     11/10/22  0840 11/10/22  1600 11/11/22  0235 11/11/22  0911 11/11/22  2310 11/12/22  0435 11/12/22  0944   WBC 21.7*  --  31.5*  --   --  19.6*  --    HGB 8.0*   < > 8.8*   < > 6.5* 7.5* 7.9*   HCT 24.5*   < > 26.6*   < > 20.1* 23.5* 24.7*     --  285  --   --  201  --     < > = values in this interval not displayed. Recent Labs     11/10/22  0840 11/11/22  0235 11/12/22  0435    146 142   K 4.2 3.6 3.4*   * 115* 112*   CO2 20* 20* 23   BUN 46* 48* 25*   CREATININE 0.9 1.0 0.9   CALCIUM 8.4* 8.1* 7.6*       Assessment:    Principal Problem:    GI bleed  Resolved Problems:    * No resolved hospital problems.  *      Plan:    78-year-old male with a recent hip arthroplasty developed a DVT and placed on Eliquis presented to [facility] ED with black tarry stools had a EGD performed and has a duodenal bulb ulcer transferred to [facility] and is admitted to the ICU      GI bleed, likely upper GI source from oral anticoagulation  -Ensure hemodynamic stability with primarily volume resuscitation with fluids versus blood, pressor support if declining blood pressure  -H&H every 6 Hours transfuse as needed to keep hemoglobin greater than 7 -currently 8.0 -patient has received a total of 4 units packed red blood cells.     -Continue with to monitor him ICU setting  -Continue to transfuse to keep above 7  -Hold all anticoagulation/antiplatelets/chemical DVT prophylaxis  -Consult general surgery -appreciate input -EGD at [facility] duodenal bulb ulcer clipped, Heema spray and epi  -Leukocytosis-27.8-likely reactive, patient does not appear toxic or acutely ill  -Monitor renal function given acute GI bleed  -General surgery on board, interventional radiology if needed    11/12/2022  Continue to monitor H&H which has remained stable through the night  No evidence of acute active bleed  off pressors, remains on fluids  Continue PPI/Carafate  Continue volume resuscitation with blood/fluid  Continue to advance diet as tolerated  No acute need for IR intervention    Code Status: Full code  Consultants: General surgery, ICU, interventional radiology  DVT Prophylaxis   PT/OT  Discharge planning     Trey White MD  12:51 PM  11/12/2022

## 2022-11-13 LAB
ALBUMIN SERPL-MCNC: 2.5 G/DL (ref 3.5–5.2)
ALP BLD-CCNC: 36 U/L (ref 40–129)
ALT SERPL-CCNC: 16 U/L (ref 0–40)
ANION GAP SERPL CALCULATED.3IONS-SCNC: 7 MMOL/L (ref 7–16)
AST SERPL-CCNC: 16 U/L (ref 0–39)
BASOPHILS ABSOLUTE: 0.07 E9/L (ref 0–0.2)
BASOPHILS RELATIVE PERCENT: 0.6 % (ref 0–2)
BILIRUB SERPL-MCNC: 0.4 MG/DL (ref 0–1.2)
BUN BLDV-MCNC: 15 MG/DL (ref 6–23)
CALCIUM SERPL-MCNC: 7.5 MG/DL (ref 8.6–10.2)
CHLORIDE BLD-SCNC: 112 MMOL/L (ref 98–107)
CO2: 22 MMOL/L (ref 22–29)
CREAT SERPL-MCNC: 0.7 MG/DL (ref 0.7–1.2)
CULTURE CATHETER TIP: NORMAL
EOSINOPHILS ABSOLUTE: 0.25 E9/L (ref 0.05–0.5)
EOSINOPHILS RELATIVE PERCENT: 2.2 % (ref 0–6)
GFR SERPL CREATININE-BSD FRML MDRD: >60 ML/MIN/1.73
GLUCOSE BLD-MCNC: 126 MG/DL (ref 74–99)
HCT VFR BLD CALC: 22.8 % (ref 37–54)
HCT VFR BLD CALC: 23.7 % (ref 37–54)
HCT VFR BLD CALC: 24.8 % (ref 37–54)
HEMOGLOBIN: 7.2 G/DL (ref 12.5–16.5)
HEMOGLOBIN: 7.5 G/DL (ref 12.5–16.5)
HEMOGLOBIN: 7.9 G/DL (ref 12.5–16.5)
IMMATURE GRANULOCYTES #: 0.32 E9/L
IMMATURE GRANULOCYTES %: 2.9 % (ref 0–5)
LYMPHOCYTES ABSOLUTE: 2.14 E9/L (ref 1.5–4)
LYMPHOCYTES RELATIVE PERCENT: 19.2 % (ref 20–42)
MAGNESIUM: 2 MG/DL (ref 1.6–2.6)
MCH RBC QN AUTO: 30.1 PG (ref 26–35)
MCHC RBC AUTO-ENTMCNC: 31.6 % (ref 32–34.5)
MCV RBC AUTO: 95.2 FL (ref 80–99.9)
METER GLUCOSE: 127 MG/DL (ref 74–99)
METER GLUCOSE: 153 MG/DL (ref 74–99)
METER GLUCOSE: 165 MG/DL (ref 74–99)
METER GLUCOSE: 167 MG/DL (ref 74–99)
MONOCYTES ABSOLUTE: 0.74 E9/L (ref 0.1–0.95)
MONOCYTES RELATIVE PERCENT: 6.6 % (ref 2–12)
NEUTROPHILS ABSOLUTE: 7.62 E9/L (ref 1.8–7.3)
NEUTROPHILS RELATIVE PERCENT: 68.5 % (ref 43–80)
PDW BLD-RTO: 18.6 FL (ref 11.5–15)
PHOSPHORUS: 2.7 MG/DL (ref 2.5–4.5)
PLATELET # BLD: 141 E9/L (ref 130–450)
PMV BLD AUTO: 9.9 FL (ref 7–12)
POTASSIUM REFLEX MAGNESIUM: 3.6 MMOL/L (ref 3.5–5)
RBC # BLD: 2.49 E12/L (ref 3.8–5.8)
SODIUM BLD-SCNC: 141 MMOL/L (ref 132–146)
TOTAL PROTEIN: 4.4 G/DL (ref 6.4–8.3)
WBC # BLD: 11.1 E9/L (ref 4.5–11.5)

## 2022-11-13 PROCEDURE — C9113 INJ PANTOPRAZOLE SODIUM, VIA: HCPCS | Performed by: INTERNAL MEDICINE

## 2022-11-13 PROCEDURE — 85014 HEMATOCRIT: CPT

## 2022-11-13 PROCEDURE — 82962 GLUCOSE BLOOD TEST: CPT

## 2022-11-13 PROCEDURE — 6370000000 HC RX 637 (ALT 250 FOR IP)

## 2022-11-13 PROCEDURE — 85025 COMPLETE CBC W/AUTO DIFF WBC: CPT

## 2022-11-13 PROCEDURE — 99233 SBSQ HOSP IP/OBS HIGH 50: CPT | Performed by: INTERNAL MEDICINE

## 2022-11-13 PROCEDURE — 2580000003 HC RX 258: Performed by: INTERNAL MEDICINE

## 2022-11-13 PROCEDURE — 85018 HEMOGLOBIN: CPT

## 2022-11-13 PROCEDURE — 84100 ASSAY OF PHOSPHORUS: CPT

## 2022-11-13 PROCEDURE — 83735 ASSAY OF MAGNESIUM: CPT

## 2022-11-13 PROCEDURE — 2700000000 HC OXYGEN THERAPY PER DAY

## 2022-11-13 PROCEDURE — 6360000002 HC RX W HCPCS: Performed by: INTERNAL MEDICINE

## 2022-11-13 PROCEDURE — 2580000003 HC RX 258

## 2022-11-13 PROCEDURE — 80053 COMPREHEN METABOLIC PANEL: CPT

## 2022-11-13 PROCEDURE — 2000000000 HC ICU R&B

## 2022-11-13 PROCEDURE — A4216 STERILE WATER/SALINE, 10 ML: HCPCS | Performed by: INTERNAL MEDICINE

## 2022-11-13 RX ORDER — PANTOPRAZOLE SODIUM 40 MG/1
40 TABLET, DELAYED RELEASE ORAL
Qty: 60 TABLET | Refills: 2 | Status: SHIPPED | OUTPATIENT
Start: 2022-11-13

## 2022-11-13 RX ADMIN — SODIUM CHLORIDE, PRESERVATIVE FREE 40 MG: 5 INJECTION INTRAVENOUS at 16:12

## 2022-11-13 RX ADMIN — Medication 10 ML: at 20:13

## 2022-11-13 RX ADMIN — SODIUM CHLORIDE, PRESERVATIVE FREE 40 MG: 5 INJECTION INTRAVENOUS at 03:18

## 2022-11-13 RX ADMIN — SODIUM CHLORIDE, POTASSIUM CHLORIDE, SODIUM LACTATE AND CALCIUM CHLORIDE: 600; 310; 30; 20 INJECTION, SOLUTION INTRAVENOUS at 04:47

## 2022-11-13 RX ADMIN — INSULIN LISPRO 1 UNITS: 100 INJECTION, SOLUTION INTRAVENOUS; SUBCUTANEOUS at 13:46

## 2022-11-13 RX ADMIN — INSULIN LISPRO 1 UNITS: 100 INJECTION, SOLUTION INTRAVENOUS; SUBCUTANEOUS at 17:45

## 2022-11-13 RX ADMIN — POTASSIUM BICARBONATE 20 MEQ: 782 TABLET, EFFERVESCENT ORAL at 06:56

## 2022-11-13 ASSESSMENT — PAIN SCALES - GENERAL: PAINLEVEL_OUTOF10: 0

## 2022-11-13 NOTE — PLAN OF CARE
Problem: Chronic Conditions and Co-morbidities  Goal: Patient's chronic conditions and co-morbidity symptoms are monitored and maintained or improved  11/13/2022 1030 by Radha Lux RN  Outcome: Progressing     Problem: Discharge Planning  Goal: Discharge to home or other facility with appropriate resources  11/13/2022 1030 by Radha Lux RN  Outcome: Progressing     Problem: Pain  Goal: Verbalizes/displays adequate comfort level or baseline comfort level  11/13/2022 1030 by Radha Lux RN  Outcome: Progressing     Problem: Safety - Adult  Goal: Free from fall injury  11/13/2022 1030 by Radha Lux RN  Outcome: Progressing     Problem: ABCDS Injury Assessment  Goal: Absence of physical injury  11/13/2022 1030 by Radha Lux RN  Outcome: Progressing

## 2022-11-13 NOTE — PROGRESS NOTES
[address] [facility]  Department of Internal Medicine   Internal Medicine Residency   MICU Progress Note    Patient:  Romulo Swain. 60 y.o. male  MRN: [id number]     Date of Service: 11/13/2022    Allergy: Patient has no known allergies. Subjective     Patient was seen and examined at bedside. He stated that he was feeling well today. He denied chest pain, shortness of breath, and abdominal pain. Denied any blood in the stool. Stated that he brought his CPAP from home but did not use it last night because it was too late to set it up.     24h change: No significant events reported overnight. Hemoglobin was stable. ROS: Denies Fever/chills/CP/SOB/N/V/D/C/Dysuria/Blood in stool or urine  Objective     VS: /74   Pulse 83   Temp 98.6 °F (37 °C) (Temporal)   Resp 27   Ht 5' 7\" (1.702 m)   Wt 250 lb 12.8 oz (113.8 kg)   SpO2 93%   BMI 39.28 kg/m²           I & O - 24hr:   Intake/Output Summary (Last 24 hours) at 11/13/2022 1317  Last data filed at 11/13/2022 1000  Gross per 24 hour   Intake 2444.45 ml   Output 2700 ml   Net -255.55 ml       Physical Exam:  General Appearance: alert, appears stated age, and cooperative  Neck: supple, symmetrical, trachea midline  Lung: clear to auscultation bilaterally  Heart: regular rate and rhythm, S1, S2 normal, no murmur, click, rub or gallop  Abdomen: soft, non-tender; bowel sounds normal; no masses,  no organomegaly  Extremities:  extremities normal, atraumatic, no cyanosis or edema  Musculoskeletal: No joint swelling, no muscle tenderness. ROM normal in all joints of extremities.    Neurologic: Mental status: Alert, oriented, thought content appropriate    Lines     site day    Art line   None    TLC None    PICC None    Hemoaccess None    Oxygen:    nasal canula at 2L/min    Medications     Nutrition:   Adult diet; easy to chew GERD/peptic ulcer    Patient currently has   DVT prophylaxis/ GI prophylaxis,    PT/OT    Labs   CBC:   Lab Results   Component Value Date/Time    WBC 11.1 11/13/2022 04:13 AM    RBC 2.49 11/13/2022 04:13 AM    HGB 7.9 11/13/2022 08:05 AM    HCT 24.8 11/13/2022 08:05 AM    MCV 95.2 11/13/2022 04:13 AM    MCH 30.1 11/13/2022 04:13 AM    MCHC 31.6 11/13/2022 04:13 AM    RDW 18.6 11/13/2022 04:13 AM     11/13/2022 04:13 AM    MPV 9.9 11/13/2022 04:13 AM     CMP:    Lab Results   Component Value Date/Time     11/13/2022 04:13 AM    K 3.6 11/13/2022 04:13 AM     11/13/2022 04:13 AM    CO2 22 11/13/2022 04:13 AM    BUN 15 11/13/2022 04:13 AM    CREATININE 0.7 11/13/2022 04:13 AM    GFRAA >60 02/25/2020 11:02 AM    LABGLOM >60 11/13/2022 04:13 AM    GLUCOSE 126 11/13/2022 04:13 AM    PROT 4.4 11/13/2022 04:13 AM    LABALBU 2.5 11/13/2022 04:13 AM    CALCIUM 7.5 11/13/2022 04:13 AM    BILITOT 0.4 11/13/2022 04:13 AM    ALKPHOS 36 11/13/2022 04:13 AM    AST 16 11/13/2022 04:13 AM    ALT 16 11/13/2022 04:13 AM       Resident's Assessment and Plan     GI  Upper GI Bleed s/p EGD cauterization  Patient developed DVT s/p R hip arthroplasty and was started on eliquis; then presented with dark tarry stools  Eliquis held  Patient transfused total 5 units pRBCs  EGD by general surgery showed posterior duodenal bulb ulcer treated with cautery clip and hemospray  Hgb stable overnight; AM Hgb 7.9  PLAN  Follow daily CBC; transfuse for Hgb < 7.0  Continue protonix BID  Continue peptic ulcer diet  General surgery following: recommend no further intervention  If any further bleeding episodes patient will need IR intervention    Heme/Onc  History of recent DVT  Patient developed DVT s/p R hip arthroplasty and was started on eliquis; then presented with dark tarry stools  Repeat doppler ultrasound showed no clot  PLAN  Hold eliquis given recent GIB  Follow-up doppler in 2 weeks     Leukocytosis- improving  WBC downtrending, 11.1 today  Patient has remained afebrile and not shown any signs of infection  PLAN  Follow blood cultures    Cardio  History HLD  Elevated troponin- improved    Nephro  Lactic acidosis- resolved    Endocrine  Diabetes mellitus type 2  Home medications: dapagliflozin 5, metformin 1000 BID  PLAN  LDSSI  POCT glucose ACHS        Genet Rodriguez MD, PGY-1    Attending physician: Dr. Prashanth Leone  Department of Pulmonary, Critical Care and Sleep Medicine  [facility]  Department of Internal Medicine      During multidisciplinary team rounds Lowell Pham. is a 60 y.o. male was seen, examined and discussed. This is confirmation that I have personally seen and examined the patient and that the key elements of the encounter were performed by me (> 85 % time). The medications & laboratory data was discussed and adjusted where necessary. The radiographic images were reviewed or with radiologist or consultant if felt dis-concordant with the exam or history. The above findings were corroborated, plans confirmed and changes made if needed. Family is updated at the bedside as available. Key issues of the case were discussed among consultants. Critical Care time is documented if appropriate. CPAP at at bedtime. Continue to monitor H&H.   Okay to transfer out of ICU today        [facility] DO

## 2022-11-13 NOTE — PLAN OF CARE
Problem: Chronic Conditions and Co-morbidities  Goal: Patient's chronic conditions and co-morbidity symptoms are monitored and maintained or improved  11/13/2022 0714 by Ty Goode RN  Outcome: Progressing  11/13/2022 0714 by Ty Goode RN  Outcome: Progressing  Flowsheets  Taken 11/13/2022 0600  Care Plan - Patient's Chronic Conditions and Co-Morbidity Symptoms are Monitored and Maintained or Improved: Monitor and assess patient's chronic conditions and comorbid symptoms for stability, deterioration, or improvement  Taken 11/13/2022 0400  Care Plan - Patient's Chronic Conditions and Co-Morbidity Symptoms are Monitored and Maintained or Improved: Monitor and assess patient's chronic conditions and comorbid symptoms for stability, deterioration, or improvement  Taken 11/13/2022 0200  Care Plan - Patient's Chronic Conditions and Co-Morbidity Symptoms are Monitored and Maintained or Improved: Monitor and assess patient's chronic conditions and comorbid symptoms for stability, deterioration, or improvement  Taken 11/13/2022 0000  Care Plan - Patient's Chronic Conditions and Co-Morbidity Symptoms are Monitored and Maintained or Improved: Monitor and assess patient's chronic conditions and comorbid symptoms for stability, deterioration, or improvement  Taken 11/12/2022 2200  Care Plan - Patient's Chronic Conditions and Co-Morbidity Symptoms are Monitored and Maintained or Improved: Monitor and assess patient's chronic conditions and comorbid symptoms for stability, deterioration, or improvement  Taken 11/12/2022 2000  Care Plan - Patient's Chronic Conditions and Co-Morbidity Symptoms are Monitored and Maintained or Improved: Monitor and assess patient's chronic conditions and comorbid symptoms for stability, deterioration, or improvement     Problem: Discharge Planning  Goal: Discharge to home or other facility with appropriate resources  11/13/2022 0714 by Ty Goode RN  Outcome: Progressing  11/13/2022 0714 by [address] Patience Sun RN  Outcome: Progressing     Problem: Pain  Goal: Verbalizes/displays adequate comfort level or baseline comfort level  11/13/2022 0714 by Ravi Jiménez RN  Outcome: Progressing  11/13/2022 0714 by Ravi Jiménez RN  Outcome: Progressing     Problem: Safety - Adult  Goal: Free from fall injury  11/13/2022 0714 by Ravi Jiménez RN  Outcome: Progressing  11/13/2022 0714 by Ravi Jiménez RN  Outcome: Progressing     Problem: ABCDS Injury Assessment  Goal: Absence of physical injury  11/13/2022 0714 by Ravi Jiménez RN  Outcome: Progressing  11/13/2022 0714 by Ravi Jiménez RN  Outcome: Progressing

## 2022-11-13 NOTE — PROGRESS NOTES
[facility]   Progress note      Subjective:    Sitting up in chair  No acute complaints    Objective:    /73   Pulse 74   Temp 98 °F (36.7 °C) (Temporal)   Resp 25   Ht 5' 7\" (1.702 m)   Wt 250 lb 12.8 oz (113.8 kg)   SpO2 94%   BMI 39.28 kg/m²     In: 2444.5 [P.O.:495; I.V.:1699.5]  Out: 3950   In: 2444.5   Out: 3950 [Urine:3950]    General appearance: NAD, conversant  HEENT: AT/NC, MMM  Neck: FROM, supple  Lungs: Clear to auscultation  CV: RRR, no MRGs  Vasc: Radial pulses 2+  Abdomen: Soft, non-tender; no masses or HSM  Extremities: No peripheral edema or digital cyanosis  Skin: no rash, lesions or ulcers  Psych: Alert and oriented to person, place and time  Neuro: Alert and interactive     Recent Labs     11/11/22 0235 11/11/22  0911 11/12/22  0435 11/12/22  0944 11/13/22  0014 11/13/22  0413 11/13/22  0805   WBC 31.5*  --  19.6*  --   --  11.1  --    HGB 8.8*   < > 7.5*   < > 7.2* 7.5* 7.9*   HCT 26.6*   < > 23.5*   < > 22.8* 23.7* 24.8*     --  201  --   --  141  --     < > = values in this interval not displayed. Recent Labs     11/11/22 0235 11/12/22  0435 11/13/22  0413    142 141   K 3.6 3.4* 3.6   * 112* 112*   CO2 20* 23 22   BUN 48* 25* 15   CREATININE 1.0 0.9 0.7   CALCIUM 8.1* 7.6* 7.5*       Assessment:    Principal Problem:    GI bleed  Resolved Problems:    * No resolved hospital problems.  *      Plan:    25-year-old male with a recent hip arthroplasty developed a DVT and placed on Eliquis presented to [facility] ED with black tarry stools had a EGD performed and has a duodenal bulb ulcer transferred to [facility] and is admitted to the ICU      GI bleed, likely upper GI source from oral anticoagulation  -Ensure hemodynamic stability with primarily volume resuscitation with fluids versus blood, pressor support if declining blood pressure  -H&H every 6 Hours transfuse as needed to keep hemoglobin greater than 7 -currently 8.0 -patient has received a total of 4 units packed red blood cells.     -Continue with to monitor him ICU setting  -Continue to transfuse to keep above 7  -Hold all anticoagulation/antiplatelets/chemical DVT prophylaxis  -Consult general surgery -appreciate input -EGD at [facility] duodenal bulb ulcer clipped, Heema spray and epi  -Leukocytosis-27.8-likely reactive, patient does not appear toxic or acutely ill  -Monitor renal function given acute GI bleed  -General surgery on board, interventional radiology if needed    11/12/2022  Continue to monitor H&H which has remained stable through the night  No evidence of acute active bleed  off pressors, remains on fluids  Continue PPI/Carafate  Continue volume resuscitation with blood/fluid  Continue to advance diet as tolerated  No acute need for IR intervention    11/13/2022  Resting comfortably  Sitting up in chair  No further bleeding  H&H have been stable  Has not required interventional radiology procedure  Okay for out of ICU  Stay off blood thinners/antiplatelet agents-no further DVT noted on repeat Doppler ultrasound    Code Status: Full code  Consultants: General surgery, ICU, interventional radiology  DVT Prophylaxis   PT/OT  Discharge planning     Kanwal Pike MD  5:17 PM  11/13/2022

## 2022-11-14 VITALS
DIASTOLIC BLOOD PRESSURE: 57 MMHG | OXYGEN SATURATION: 98 % | HEIGHT: 67 IN | RESPIRATION RATE: 20 BRPM | BODY MASS INDEX: 39.36 KG/M2 | TEMPERATURE: 97.9 F | HEART RATE: 93 BPM | WEIGHT: 250.8 LBS | SYSTOLIC BLOOD PRESSURE: 127 MMHG

## 2022-11-14 LAB
ALBUMIN SERPL-MCNC: 2.8 G/DL (ref 3.5–5.2)
ALP BLD-CCNC: 42 U/L (ref 40–129)
ALT SERPL-CCNC: 17 U/L (ref 0–40)
ANION GAP SERPL CALCULATED.3IONS-SCNC: 8 MMOL/L (ref 7–16)
AST SERPL-CCNC: 19 U/L (ref 0–39)
BASOPHILS ABSOLUTE: 0.09 E9/L (ref 0–0.2)
BASOPHILS RELATIVE PERCENT: 0.8 % (ref 0–2)
BILIRUB SERPL-MCNC: 0.5 MG/DL (ref 0–1.2)
BUN BLDV-MCNC: 12 MG/DL (ref 6–23)
CALCIUM SERPL-MCNC: 8 MG/DL (ref 8.6–10.2)
CHLORIDE BLD-SCNC: 111 MMOL/L (ref 98–107)
CO2: 23 MMOL/L (ref 22–29)
CREAT SERPL-MCNC: 0.8 MG/DL (ref 0.7–1.2)
EOSINOPHILS ABSOLUTE: 0.36 E9/L (ref 0.05–0.5)
EOSINOPHILS RELATIVE PERCENT: 3.4 % (ref 0–6)
GFR SERPL CREATININE-BSD FRML MDRD: >60 ML/MIN/1.73
GLUCOSE BLD-MCNC: 122 MG/DL (ref 74–99)
H PYLORI ANTIGEN STOOL: POSITIVE
HCT VFR BLD CALC: 28.2 % (ref 37–54)
HEMOGLOBIN: 8.7 G/DL (ref 12.5–16.5)
IMMATURE GRANULOCYTES #: 0.28 E9/L
IMMATURE GRANULOCYTES %: 2.6 % (ref 0–5)
LYMPHOCYTES ABSOLUTE: 1.8 E9/L (ref 1.5–4)
LYMPHOCYTES RELATIVE PERCENT: 17 % (ref 20–42)
MAGNESIUM: 2.1 MG/DL (ref 1.6–2.6)
MCH RBC QN AUTO: 30.3 PG (ref 26–35)
MCHC RBC AUTO-ENTMCNC: 30.9 % (ref 32–34.5)
MCV RBC AUTO: 98.3 FL (ref 80–99.9)
METER GLUCOSE: 138 MG/DL (ref 74–99)
METER GLUCOSE: 164 MG/DL (ref 74–99)
MONOCYTES ABSOLUTE: 0.81 E9/L (ref 0.1–0.95)
MONOCYTES RELATIVE PERCENT: 7.6 % (ref 2–12)
NEUTROPHILS ABSOLUTE: 7.25 E9/L (ref 1.8–7.3)
NEUTROPHILS RELATIVE PERCENT: 68.6 % (ref 43–80)
PDW BLD-RTO: 18.9 FL (ref 11.5–15)
PHOSPHORUS: 3.4 MG/DL (ref 2.5–4.5)
PLATELET # BLD: 196 E9/L (ref 130–450)
PMV BLD AUTO: 10.4 FL (ref 7–12)
POTASSIUM REFLEX MAGNESIUM: 4 MMOL/L (ref 3.5–5)
RBC # BLD: 2.87 E12/L (ref 3.8–5.8)
SODIUM BLD-SCNC: 142 MMOL/L (ref 132–146)
TOTAL PROTEIN: 5 G/DL (ref 6.4–8.3)
WBC # BLD: 10.6 E9/L (ref 4.5–11.5)

## 2022-11-14 PROCEDURE — A4216 STERILE WATER/SALINE, 10 ML: HCPCS | Performed by: INTERNAL MEDICINE

## 2022-11-14 PROCEDURE — 80053 COMPREHEN METABOLIC PANEL: CPT

## 2022-11-14 PROCEDURE — 97530 THERAPEUTIC ACTIVITIES: CPT

## 2022-11-14 PROCEDURE — 83735 ASSAY OF MAGNESIUM: CPT

## 2022-11-14 PROCEDURE — 97161 PT EVAL LOW COMPLEX 20 MIN: CPT

## 2022-11-14 PROCEDURE — 97165 OT EVAL LOW COMPLEX 30 MIN: CPT

## 2022-11-14 PROCEDURE — C9113 INJ PANTOPRAZOLE SODIUM, VIA: HCPCS | Performed by: INTERNAL MEDICINE

## 2022-11-14 PROCEDURE — 6360000002 HC RX W HCPCS: Performed by: INTERNAL MEDICINE

## 2022-11-14 PROCEDURE — 84100 ASSAY OF PHOSPHORUS: CPT

## 2022-11-14 PROCEDURE — 82962 GLUCOSE BLOOD TEST: CPT

## 2022-11-14 PROCEDURE — 2580000003 HC RX 258: Performed by: INTERNAL MEDICINE

## 2022-11-14 PROCEDURE — 97535 SELF CARE MNGMENT TRAINING: CPT

## 2022-11-14 PROCEDURE — 36415 COLL VENOUS BLD VENIPUNCTURE: CPT

## 2022-11-14 PROCEDURE — 85025 COMPLETE CBC W/AUTO DIFF WBC: CPT

## 2022-11-14 RX ADMIN — SODIUM CHLORIDE, PRESERVATIVE FREE 40 MG: 5 INJECTION INTRAVENOUS at 03:42

## 2022-11-14 RX ADMIN — SODIUM CHLORIDE, PRESERVATIVE FREE 40 MG: 5 INJECTION INTRAVENOUS at 14:31

## 2022-11-14 ASSESSMENT — PAIN SCALES - GENERAL: PAINLEVEL_OUTOF10: 0

## 2022-11-14 NOTE — PROGRESS NOTES
Physical Therapy  Physical Therapy Initial Evaluation    Name: Stevan Christianson. : 1960  MRN: [id number]      Date of Service: 2022    Evaluating PT:  Mine Odonnell, PT [id number]    Referring provider/PT Order:  PT Eval and Treat   22 0930  PT eval and treat        Rogers Schroeder DO     Room #:  5746/4323-Q  Diagnosis:  GI bleed [K92.2]  PMHx/PSHx:     has a past medical history of Abdominal hernia, Arthritis, Bilateral knee pain, Diabetes mellitus ([address] Diverticulitis, Hyperlipidemia, [place]SA[place] on CPAP, and Right hip pain. has a past surgical history that includes Knee arthroscopy; hernia repair; Abdomen surgery (); Abdomen surgery (); tumor excision (CHILD); myringotomy (03/15/2012); Cataract extraction, extracapsular w/ intraocular lens implant (Bilateral); Colonoscopy; Total hip arthroplasty (Right, 10/26/2022); and Upper gastrointestinal endoscopy (N/A, 11/10/2022). Procedure/Surgery:  Recent R THR 10/26/22  Precautions:  Falls,  WBAT (weight bearing as tolerated) RLE, posterior hip precautions  Equipment Needs: Patient has needed equipment ,    SUBJECTIVE:    Patient lives with spouse  in a ranch home  with 3 steps to enter with 1 Rail  Bed is on 1 floor and bath is on 1 floor. Patient ambulated with wheeled walker  PTA. Equipment owned:  [employer],      OBJECTIVE:   Initial Evaluation  Date: 22 Treatment Short Term/ Long Term   Goals   AM-PAC 6 Clicks 32/46     Was pt agreeable to Eval/treatment? yes     Does pt have pain? minimal     Bed Mobility  Rolling: SBA  Supine to sit:   SBA   Sit to supine: SBA   Scooting: SBA   Rolling: Ind  Supine to sit: Ind  Sit to supine: Ind  Scooting: Ind   Transfers Sit to stand: SBA to fww  Stand to sit: SBA  Stand pivot: SBA with fww  Sit to stand: Mod Ind  to fww  Stand to sit: Mod Ind    Stand pivot:  Mod Ind  with fww   Ambulation    100 feet with fww SBA  150+ feet with Mod Ind     Stair negotiation: ascended and descended  NT  3 steps with SBA 1 rail      Strength/ROM:     RLE grossly 4+/5  LLE grossly 4+/5  RLE AROM WFL  LLE AROM WFL    Balance:   Static Sitting: Ind  Dynamic Sitting: Ind  Static Standing: SBA   Dynamic Standing: SBA       Patient is Alert & Oriented x person, place, time, and situation and follows directions   Sensation:  Pt denies numbness and tingling to extremities  Edema:  none  Therapeutic Exercises:  Functional activity with fww    Patient education  Pt educated on role of Physical Therapy, risks of immobility, safety and plan of care,  importance of mobility while in hospital , hip precautions, safety , and weight bearing status  and use of call light for safety. Patient response to education:   Pt verbalized understanding Pt demonstrated skill Pt requires further education in this area   yes yes Reminders     ASSESSMENT:    Conditions Requiring Skilled Therapeutic Intervention:    [x]Decreased strength     [x]Decreased ROM  [x]Decreased functional mobility  [x]Decreased balance   [x]Decreased endurance   [x]Decreased posture  []Decreased sensation  []Decreased coordination   []Decreased vision  [x]Decreased safety awareness   []Increased pain       Comments:    RN cleared patient for participation in therapy session. Patient was seen this date for PT evaluation. . Patient was agreeable to intervention. Results of the functional assessment are noted above. Upon entering the room patient was found supine in bed. Sat EOB x 10 minutes to increase dynamic sitting balance and activity tolerance. Gait and transfers completed with fww with SBA cues for sequencing. At end of session, patient in chair with  call light and phone within reach,  all lines and tubes intact, nursing notified. This patient can benefit from the continuation of skilled PT home with home health PT to maximize functional level and return to PLOF.    Treatment:  Patient completed and was instructed in the following treatment:      Bed mobility training - pt given verbal and tactile cues to facilitate proper sequencing and safety during rolling and supine>sit as well as provided with physical assistance to complete task    Assistive device training - pt educated on using [employer] during gait, [employer] approximation/negotiation, and hand placement during sit<>stand to [employer]  STS and transfer training - educated on hand/foot placement, safety, and sequencing during STS and pivot transfers using assistive device  Gait training - verbal cues for  assistive device approximation/negotiation, upright posture, and safety during 90 and 180 degree turns during gait   Education in [place] RLE, hip precautions. Use of call light for safety  Therapeutic exercise was completed to improve strength of BLE to improve functional mobility status. BLE AROM for neuroglides  Skillful positioning in bed to protect skin/joint integrity. Vitals and symptoms were closely monitored throughout session. Pt's/ family goals      1. Home     Prognosis is good  for reaching above PT goals.     Patient and or family understand(s) diagnosis, prognosis, and plan of care.  yes,     PHYSICAL THERAPY PLAN OF CARE:    PT POC is established based on physician order and patient diagnosis     Diagnosis:  GI bleed [K92.2]  Specific instructions for next treatment:  Sit EOB, postural exercises, Transfer to bedside chair, and Increase ambulation distance  Current Treatment Recommendations:     [x] Strengthening to improve independence with functional mobility   [x] ROM to improve independence with functional mobility   [x] Balance Training to improve static/dynamic balance and to reduce fall risk  [x] Endurance Training to improve activity tolerance during functional mobility   [x] Transfer Training to improve safety and independence with all functional transfers   [x] Gait Training to improve gait mechanics, endurance and asses need for appropriate assistive device  [x] Stair Training in preparation for safe discharge home and/or into the community when appropriate  [] Positioning to prevent skin breakdown and contractures  [x] Safety and Education Training   [] Patient/Caregiver Education   [] HEP  [] Gait Team to be added to POC  [] Other     PT long term treatment goals are located in above grid    Frequency of treatments: 2-5x/week x 1-2 weeks. Time in  0810  Time out  0850    Total Treatment Time  25 minutes     Evaluation Time includes thorough review of current medical information, gathering information on past medical history/social history and prior level of function, completion of standardized testing/informal observation of tasks, assessment of data and education on plan of care and goals.     CPT codes:  [x] Low Complexity PT evaluation 15752  [] Moderate Complexity PT evaluation 71672  [] High Complexity PT evaluation 54550  [] PT Re-evaluation 07378  [] Gait training 21131 - minutes  [] Manual therapy 39376  minutes  [x] Therapeutic activities 21793 -25 minutes  [] Therapeutic exercises 16582 - minutes  [] Neuromuscular reeducation [id number] minutes     Jose Fleming, [address] [facility]

## 2022-11-14 NOTE — CARE COORDINATION
Care Coordination: Plan is to return home with wife and resume [facility]. Will need resume of care orders at discharge. Ptx and otx Ampac 18/24. Per previous CM notes in ICU, pt would like new walker. Pt states Dr Is discharging today, would like new walker, has a 13year old one and has no preference of dme. He has no preference is is okay with using [facility]. I left a message with Hossein Chance at [address] [facility] for delivery today or to home tomorrow. He is on room air. Son in room. He states sister is 15 minutes away and will  at discharge. Does not anticipate any other home going needs .   WILL NEED RESUME OF CARE ORDERS AND WALKER ORDERS      Josseline Mireles    Addendum: orders placed for [facility] resumption and ww and I have notified Zeenat from [facility] for possible DC today    Josseline Mireles

## 2022-11-14 NOTE — PROGRESS NOTES
techniques and functional independence  * Recommendation of environmental modifications for increased safety with functional transfers/mobility and ADLs  * Therapeutic exercise to improve motor endurance, ROM, and functional strength for ADLs/functional transfers  * Therapeutic activities to facilitate/challenge dynamic balance, stand tolerance for increased safety and independence with ADLs      Recommended Adaptive Equipment:  TBD     Home Living: Pt lives with wife in a 1 story home with 3 Nitish    Bathroom setup: tub/shower combo    Equipment owned: w/w, cane, reacher, sock aide, long handled shoe horn, long handled sponge    Prior Level of Function: Mod I with ADLs , mod I with IADLs; ambulated with w/w (since surgery)   Driving: yes   Occupation: enjoys riding his motorcycle     Pain Level: Pt denies pain this session    Cognition: A&O: 4/4; Follows 2 step directions   Memory:  good   Sequencing:  good   Problem solving:  fair   Judgement/safety:  fair     Functional Assessment:  AM-PAC Daily Activity Raw Score: 18/24   Initial Eval Status  Date: 11/14/22 Treatment Status  Date: STGs = LTGs  Time frame: 10-14 days   Feeding Independent      Grooming Stand by Assist   Modified [place]    UB Dressing Supervision       LB Dressing Moderate Assist   Modified [place]    Bathing Minimal Assist  Modified [place]    Toileting Stand by Assist   Modified [place]    Bed Mobility  Supine to sit: Stand by Assist   Sit to supine: NT  Supine to sit: Modified [place]   Sit to supine: Modified [place]    Functional Transfers Stand by Assist   Modified [place]    Functional Mobility Stand by Assist     Ambulated in room and hallway with w/w   Modified [place]    Balance Sitting:     Static:  indep    Dynamic:sup  Standing: SBA     Activity Tolerance F  G   Visual/  Perceptual Glasses: yes  wfl                    Hand Dominance right   Strength ROM Additional Info:    DELMY   4/5 wfl good  and wfl FMC/dexterity noted during ADL tasks     LUE 4/5 wfl good  and wfl FMC/dexterity noted during ADL tasks     Hearing: wfl  Sensation:wfl  Tone: wfl  Edema:none noted     Comments: Upon arrival patient supine in bed and agreeable to OT Session. Therapist educated pt on role of OT. At end of session, patient seated in chair with call light and phone within reach, all lines and tubes intact. Overall patient demonstrated decreased independence and safety during completion of ADL/functional transfer/mobility tasks. Pt would benefit from continued skilled OT to increase safety and independence with completion of ADL/IADL tasks for functional independence and quality of life. Treatment: OT treatment provided this date includes: Therapist educated pt on role of OT and posterior hip precautions. Therapist facilitated bed mobility, sitting balance at EOB (addressing posture, weight shifting, light dynamic reaching),  functional transfers (various surfaces; bed, chair), standing tolerance tasks (addressing posture, balance and activity tolerance) and functional ambulation task with w/w - skilled cuing on hand / LE positioning, body mechanics and safety. Therapist facilitated self-care retraining: education regarding LB self-care tasks (modified techniques and limitations with precautions - pt verbalized understating). Therapist facilitated UB dressing and standing grooming task educating pt on modified techniques, posture, safety and energy conservation techniques. Skilled monitoring of HR, O2 sats and pts response to treatment. Rehab Potential: Good  for established goals     Patient / Family Goal: return home      Patient and/or family were instructed on functional diagnosis, prognosis/goals and OT plan of care. Demonstrated fair understanding.      Eval Complexity: Low    Time In: 805  Time Out: 845  Total Treatment Time: 25 minutes    Min Units   OT Eval Low 97165  x  1   OT Eval Medium 69242 OT Eval High [id number]      OT Re-Eval [id number]       Therapeutic Ex [id number]       Therapeutic Activities 31015  16  1   ADL/Self Care 54822 9  1   Orthotic Management 72796       Manual 90901     Neuro Re-Ed 42512       Non-Billable Time          Evaluation Time additionally includes thorough review of current medical information, gathering information on past medical history/social history and prior level of function, interpretation of standardized testing/informal observation of tasks, assessment of data and development of plan of care and goals.           Mary Kate Higgins OTR/L #[id number]

## 2022-11-14 NOTE — PROGRESS NOTES
Surgery states that patient is stable for discharge. Message left for [facility] NP that surgery has cleared Pt for discharge.

## 2022-11-15 LAB
BLOOD BANK DISPENSE STATUS: NORMAL
BLOOD BANK PRODUCT CODE: NORMAL
BLOOD CULTURE, ROUTINE: ABNORMAL
BPU ID: NORMAL
CULTURE, BLOOD 2: NORMAL
DESCRIPTION BLOOD BANK: NORMAL
ORGANISM: ABNORMAL

## 2022-11-16 LAB
BLOOD CULTURE, ROUTINE: NORMAL
CULTURE, BLOOD 2: NORMAL

## 2022-12-05 NOTE — DISCHARGE SUMMARY
[facility]   Discharge summary   Patient ID:  Vicky Moncada  [id number]  60 y.o.  1960    Admit date: 11/11/2022    Discharge date and time: 11/14/22    Admission Diagnoses:   Patient Active Problem List   Diagnosis    Mass of right axilla    Skin tag    Primary osteoarthritis of right hip    DM (diabetes mellitus) ([facility]    GI bleed    Acute blood loss anemia    Hypotension due to blood loss    Acute deep vein thrombosis (DVT) of right lower extremity ([facility]       Discharge Diagnoses: GIB     Consults: general surgery    Procedures: EGD CONTROL HEMORRHAGE    Hospital Course:    48-year-old male with a recent hip arthroplasty developed a DVT and placed on Eliquis presented to [facility] ED with black tarry stools had a EGD performed and has a duodenal bulb ulcer transferred to [facility] and is admitted to the ICU      GI bleed, likely upper GI source from oral anticoagulation  -Ensure hemodynamic stability with primarily volume resuscitation with fluids versus blood, pressor support if declining blood pressure  -H&H every 6 Hours transfuse as needed to keep hemoglobin greater than 7 -currently 8.0 -patient has received a total of 4 units packed red blood cells.     -Continue with to monitor him ICU setting  -Continue to transfuse to keep above 7  -Hold all anticoagulation/antiplatelets/chemical DVT prophylaxis  -Consult general surgery -appreciate input -EGD at [facility] duodenal bulb ulcer clipped, Heema spray and epi  -Leukocytosis-27.8-likely reactive, patient does not appear toxic or acutely ill  -Monitor renal function given acute GI bleed  -General surgery on board, interventional radiology if needed     11/12/2022  Continue to monitor H&H which has remained stable through the night  No evidence of acute active bleed  off pressors, remains on fluids  Continue PPI/Carafate  Continue volume resuscitation with blood/fluid  Continue to advance diet as tolerated  No acute need for IR intervention     11/13/2022  Resting comfortably  Sitting up in chair  No further bleeding  H&H have been stable  Has not required interventional radiology procedure  Okay for out of ICU  Stay off blood thinners/antiplatelet agents-no further DVT noted on repeat Doppler ultrasound    11/14/22  -stay off blood thinners at discharge  -d/c'd to rehab     No results for input(s): WBC, HGB, HCT, PLT in the last 72 hours. No results for input(s): NA, K, CL, CO2, BUN, CREATININE, GLU, CALCIUM in the last 72 hours. No results found. Discharge Exam:    HEENT: NCAT,  PERRLA, No JVD  Heart:  RRR, no murmurs, gallops, or rubs. Lungs:  CTA bilaterally, no wheeze, rales or rhonchi  Abd: bowel sounds present, nontender, nondistended, no masses  Extrem:  No clubbing, cyanosis, or edema    Disposition: [facility]     Patient Condition at Discharge: Stable     Patient Instructions:      Medication List        START taking these medications      pantoprazole 40 MG tablet  Commonly known as: PROTONIX  Take 1 tablet by mouth 2 times daily (before meals)            CONTINUE taking these medications      atorvastatin 10 MG tablet  Commonly known as: LIPITOR     dapagliflozin 5 MG tablet  Commonly known as: FARXIGA     etodolac 400 MG SR tablet  Commonly known as: LODINE XL     metFORMIN 1000 MG tablet  Commonly known as: GLUCOPHAGE     MiraLax 17 g packet  Generic drug: polyethylene glycol            STOP taking these medications      apixaban starter pack 5 MG Tbpk tablet  Commonly known as: Eliquis DVT/PE Starter Pack               Where to Get Your Medications        These medications were sent to [address] [place] [address], [address] [place] [address]. Lucas Tam [phone] Jaycee Brown [phone]  [address] [address]      Phone: [phone]   pantoprazole 40 MG tablet       Activity: activity as tolerated  Diet: regular diet    Pt has been advised to:     Follow-up with Nancy Simpson DO in 1 week.   Follow-up with consultants as recommended by them    Note that over 30 minutes was spent in preparing discharge papers, discussing discharge with patient, medication review, etc.    Signed:  Mine Corcoran MD  11/14/22  11:34 AM

## 2023-02-28 NOTE — CONSULTS
Advance Care Planning     Advance Care Planning (ACP) Physician/NP/PA Conversation    Date of Conversation: 2/28/2023  Conducted with: Patient with Decision Making Capacity    Healthcare Decision Maker:      Primary Decision Maker: Alejandra Narayanan - [phone]    Click here to complete Healthcare Decision Makers including selection of the Healthcare Decision Maker Relationship (ie \"Primary\")  Today we documented Decision Maker(s). The patient will provide ACP documents. Care Preferences:    Hospitalization: \"If your health worsens and it becomes clear that your chance of recovery is unlikely, what would be your preference regarding hospitalization? \"  The patient is unsure. Ventilation: \"If you were unable to breath on your own and your chance of recovery was unlikely, what would be your preference about the use of a ventilator (breathing machine) if it was available to you? \"  The patient is unsure. Resuscitation: \"In the event your heart stopped as a result of an underlying serious health condition, would you want attempts made to restart your heart, or would you prefer a natural death? \"  The patient is unsure.     treatment goals, benefit/burden of treatment options, artificial nutrition, ventilation preferences, hospitalization preferences, resuscitation preferences, end of life care preferences (vegetative state/imminent death), and hospice care    Conversation Outcomes / Follow-Up Plan:  ACP in process - information provided, considering goals and options  Reviewed DNR/DNI and patient elects Full Code (Attempt Resuscitation)    Length of Voluntary ACP Conversation in minutes:  16 minutes    Purvi Montes MD Vascular Surgery Inpatient Consultation Note      Reason for Consultation:  R LE DVT, GIB    HISTORY OF PRESENT ILLNESS:                The patient is a 60 y.o. male who is admitted to the hospital for treatment of acute blood loss anemia. The underwent right total hip arthoplasty on 10/26/22. The patient developed right leg pain and swelling and was evaluated in the ED on 11/2/22 and was found to have right peroneal vein DVT. He was started on eliquis and discharged home. The patient soon after developed increased frequency of dark, tarry stools. Repeat venous ultrasound today was negative for DVT. As part of his workup a CT chest was also done that was negative for PE. Incidentally the CT noted severe anatomic stenosis of the SMA, approximately 70%. He denies abdominal pain. He denies food fear. He denies postprandial abdominal pain. He denies fever or chills. He has lost 12 pounds since his hip surgery. He is not a smoker. Vascular surgery is consulted for evaluation and treatment. IMPRESSION:  Resolved R peroneal vein DVT, GIB. Asymptomatic SMA stenosis     RECOMMENDATIONS:  The right peroneal vein DVT was not seen on repeat ultrasound today. Therefore, the patient does not require therapeutic anticoagulation. I would also not recommend IVC filter insertion. The patient had incidental finding of SMA stenosis, which appears to be soft plaque. This is not the cause of his anemia. He is asymptomatic from the stenosis. F/u as outpatient for this. No plan for vascular surgery intervention. Pt seen and plan reviewed with Dr. Nicolasa Abrams.      Patient Active Problem List   Diagnosis Code    Mass of right axilla R22.31    Skin tag L91.8    Primary osteoarthritis of right hip M16.11    DM (diabetes mellitus) ([address] E11.9    GI bleed K92.2    Acute blood loss anemia D62    Hypotension due to blood loss I95.89, R58    Acute deep vein thrombosis (DVT) of right lower extremity ([address] I82.401       Past Medical History: Diagnosis Date    Abdominal hernia     Arthritis     Bilateral knee pain     Diabetes mellitus ([address]     Diverticulitis     Hyperlipidemia     OKSANA on CPAP     Right hip pain         Past Surgical History:   Procedure Laterality Date    ABDOMEN SURGERY  1990'S    BOWEL SURGERY TO REPAIR RUPTURED COLON, BOWEL RESECTION WITH COLOSTOMY    ABDOMEN SURGERY  1990'S    REVERSAL OF COLOSTOMY    CATARACT EXTRACTION EXTRACAPSULAR W/ INTRAOCULAR LENS IMPLANTATION Bilateral     COLONOSCOPY      HERNIA REPAIR      STOMACH    KNEE ARTHROSCOPY      LEFT    MYRINGOTOMY  03/15/2012    right ear with nasopharyngoscopy with biopsy    TOTAL HIP ARTHROPLASTY Right 10/26/2022    ROBOTIC FERN ASSISTED RIGHT HIP TOTAL ARTHROPLASTY    +++NATANAEL+++ performed by Maritza Lozano MD at [address] [facility] LEFT EAR       Current Medications:    sodium chloride      sodium chloride      norepinephrine Stopped (11/10/22 0829)    dextrose      sodium chloride 75 mL/hr at 11/10/22 1258      sodium chloride, sodium chloride, glucose, dextrose bolus **OR** dextrose bolus, glucagon (rDNA), dextrose    insulin lispro  0-4 Units SubCUTAneous TID WC    insulin lispro  0-4 Units SubCUTAneous Nightly    atorvastatin  10 mg Oral Daily    metFORMIN  1,000 mg Oral BID WC    pantoprazole (PROTONIX) 40 mg injection  40 mg IntraVENous Q12H        Allergies:  Patient has no known allergies.     Social History     Socioeconomic History    Marital status:      Spouse name: Not on file    Number of children: Not on file    Years of education: Not on file    Highest education level: Not on file   Occupational History    Not on file   Tobacco Use    Smoking status: Never    Smokeless tobacco: Never   Vaping Use    Vaping Use: Never used   Substance and Sexual Activity    Alcohol use: Yes     Comment: OCCASIONAL    Drug use: No    Sexual activity: Not on file   Other Topics Concern    Not on file   Social History Narrative    Not on file Social Determinants of Health     Financial Resource Strain: Not on file   Food Insecurity: Not on file   Transportation Needs: Not on file   Physical Activity: Not on file   Stress: Not on file   Social Connections: Not on file   Intimate Partner Violence: Not on file   Housing Stability: Not on file        No family history on file.     REVIEW OF SYSTEMS:      Eyes:      Blurred vision:  No [x]/Yes []               Diplopia:   No [x]/Yes []               Vision loss:       No [x]/Yes []   Ears, nose, throat:             Hearing loss:    No [x]/Yes []      Vertigo:   No [x]/Yes []                       Swallowing problem:  No [x]/Yes []               Nose bleeds:   No [x]/Yes []      Voice hoarseness:  No [x]/Yes []  Respiratory:             Cough:   No [x]/Yes []      Pleuritic chest pain:  No [x]/Yes []                        Dyspnea:   No [x]/Yes []      Wheezing:   No [x]/Yes []  Cardiovascular:             Angina:   No [x]/Yes []      Palpitations:   No [x]/Yes []          Claudication:    No [x]/Yes []      Leg swelling:   No []/Yes [x]  Gastrointestinal:             Nausea or vomiting:  No [x]/Yes []               Abdominal pain:  No [x]/Yes []                     Intestinal bleeding: No [x]/Yes []  Musculoskeletal:             Leg pain:   No [x]/Yes []      Back pain:   No [x]/Yes []                    Weakness:   No [x]/Yes []  Neurologic:             Numbness:   No [x]/Yes []      Paralysis:   No [x]/Yes []                       Headaches:   No [x]/Yes []  Hematologic, lymphatic:   Anemia:   No [x]/Yes []              Bleeding or bruising:  No [x]/Yes []              Fevers or chills: No [x]/Yes []  Endocrine:             Temp intolerance:   No [x]/Yes []                       Polydipsia, polyuria:  No [x]/Yes []  Skin:              Rash:    No [x]/Yes []      Ulcers:   No [x]/Yes []              Abnorm pigment: No [x]/Yes []  :              Frequency/urgency:  No [x]/Yes []      Hematuria:    No [x]/Yes []                      Incontinence:    No [x]/Yes []    PHYSICAL EXAM:  Vitals:    11/10/22 1200   BP:    Pulse: (!) 127   Resp: 18   Temp:    SpO2: 98%     General Appearance: alert and oriented to person, place and time, in no acute distress, well developed and well- nourished  Neurologic: no cranial nerve deficit, speech normal  Head: normocephalic and atraumatic  Eyes: extraocular eye movements intact, conjunctivae normal  ENT: external ear and ear canal normal bilaterally, nose without deformity  Pulmonary/Chest: normal air movement, no respiratory distress  Cardiovascular: tachycardic, regular rhythm  Abdomen: non-distended, no masses  Musculoskeletal: no joint deformity or tenderness  Extremities: +right  leg edema   Skin: warm and dry, no rash or erythema    PULSE EXAM      Right      Left   Brachial     Radial 2 2   Femoral     Popliteal     Dorsalis Pedis 2 2   Posterior Tibial 2 2   (3=normal, 2=diminished, 1=barely palpable, 4=widened)    LABS:    Lab Results   Component Value Date    WBC 21.7 (H) 11/10/2022    HGB 8.0 (L) 11/10/2022    HCT 24.5 (L) 11/10/2022     11/10/2022    PROTIME 13.9 (H) 11/10/2022    INR 1.3 11/10/2022    K 4.2 11/10/2022    BUN 46 (H) 11/10/2022    CREATININE 0.9 11/10/2022       RADIOLOGY:  All pertinent imaging reviewed

## 2023-03-03 ENCOUNTER — HOSPITAL ENCOUNTER (OUTPATIENT)
Age: 63
Discharge: HOME | End: 2023-03-05

## 2024-02-26 ENCOUNTER — HOSPITAL ENCOUNTER (OUTPATIENT)
Dept: CT IMAGING | Age: 64
Discharge: HOME | End: 2024-02-28
Attending: FAMILY MEDICINE
Payer: COMMERCIAL

## 2024-02-26 DIAGNOSIS — Z87.891 PERSONAL HISTORY OF TOBACCO USE, PRESENTING HAZARDS TO HEALTH: ICD-10-CM

## 2024-02-26 DIAGNOSIS — F17.210 CIGARETTE SMOKER: ICD-10-CM

## 2024-02-26 DIAGNOSIS — Z12.2 SCREENING FOR MALIGNANT NEOPLASM OF RESPIRATORY ORGAN: ICD-10-CM

## 2024-02-26 PROCEDURE — 71271 CT THORAX LUNG CANCER SCR C-: CPT

## 2024-02-27 ENCOUNTER — TELEPHONE (OUTPATIENT)
Dept: CASE MANAGEMENT | Age: 64
End: 2024-02-27

## 2024-02-27 NOTE — TELEPHONE ENCOUNTER
No call, encounter opened to process CT Lung Screening.    CT Lung Screen: 2/26/2024    IMPRESSION:  1. There is no pulmonary infiltrate, mass or suspicious pulmonary nodule.  2. Emphysematous changes     LUNG RADS:  Lung-RADS 1 - Negative (v2022)     Management:  12 month screening LDCT     RECOMMENDATIONS:  If you would like to register your patient with the [facility] Lung Nodule/Lung  Cancer Screening Program, please contact the Nurse Navigator at  [phone].    Pack years:     Social History     Tobacco Use  Smoking Status: Never    Start Date:    Quit Date:    Types: Cigarettes   Packs/Day:    Years:    Pack Years:    Smokeless Tobacco: Never         Results letter sent to patient via my chart or mailed.     Perry Aguirre  Imaging Navigator   [facility]   [phone]

## 2024-07-17 ENCOUNTER — APPOINTMENT (OUTPATIENT)
Dept: GENERAL RADIOLOGY | Age: 64
End: 2024-07-17
Payer: COMMERCIAL

## 2024-07-17 ENCOUNTER — HOSPITAL ENCOUNTER (EMERGENCY)
Age: 64
Discharge: HOME | End: 2024-07-17
Payer: COMMERCIAL

## 2024-07-17 VITALS
DIASTOLIC BLOOD PRESSURE: 79 MMHG | OXYGEN SATURATION: 93 % | TEMPERATURE: 97.6 F | RESPIRATION RATE: 17 BRPM | HEART RATE: 70 BPM | SYSTOLIC BLOOD PRESSURE: 129 MMHG

## 2024-07-17 DIAGNOSIS — S76.011A STRAIN OF RIGHT HIP, INITIAL ENCOUNTER: Primary | ICD-10-CM

## 2024-07-17 PROCEDURE — 73502 X-RAY EXAM HIP UNI 2-3 VIEWS: CPT

## 2024-07-17 PROCEDURE — 99283 EMERGENCY DEPT VISIT LOW MDM: CPT

## 2024-07-17 PROCEDURE — 6370000000 HC RX 637 (ALT 250 FOR IP): Performed by: NURSE PRACTITIONER

## 2024-07-17 RX ORDER — HYDROCODONE BITARTRATE AND ACETAMINOPHEN 5; 325 MG/1; MG/1
1 TABLET ORAL ONCE
Status: COMPLETED | OUTPATIENT
Start: 2024-07-17 | End: 2024-07-17

## 2024-07-17 RX ADMIN — HYDROCODONE BITARTRATE AND ACETAMINOPHEN 1 TABLET: 5; 325 TABLET ORAL at 09:32

## 2024-07-17 ASSESSMENT — PAIN DESCRIPTION - ORIENTATION: ORIENTATION: RIGHT

## 2024-07-17 ASSESSMENT — PAIN DESCRIPTION - DESCRIPTORS: DESCRIPTORS: ACHING;SORE;DISCOMFORT

## 2024-07-17 ASSESSMENT — PAIN DESCRIPTION - LOCATION: LOCATION: HIP

## 2024-07-17 ASSESSMENT — PAIN SCALES - GENERAL: PAINLEVEL_OUTOF10: 8

## 2025-02-27 ENCOUNTER — TRANSCRIBE ORDERS (OUTPATIENT)
Dept: ADMINISTRATIVE | Age: 65
End: 2025-02-27

## 2025-02-27 DIAGNOSIS — Z87.891 PERSONAL HISTORY OF TOBACCO USE: Primary | ICD-10-CM

## 2025-04-30 ENCOUNTER — HOSPITAL ENCOUNTER (OUTPATIENT)
Dept: CT IMAGING | Age: 65
Discharge: HOME | End: 2025-05-02
Attending: FAMILY MEDICINE
Payer: COMMERCIAL

## 2025-04-30 DIAGNOSIS — Z87.891 PERSONAL HISTORY OF TOBACCO USE: ICD-10-CM

## 2025-04-30 PROCEDURE — 71271 CT THORAX LUNG CANCER SCR C-: CPT

## (undated) DEVICE — CHLORAPREP 26ML ORANGE

## (undated) DEVICE — KIT DRP FOR RIO ROBOTIC ARM ASST SYS

## (undated) DEVICE — DRESSING HYDROFIBER AQUACEL AG ADVANTAGE 3.5X10 IN

## (undated) DEVICE — 3M™ IOBAN™ 2 ANTIMICROBIAL INCISE DRAPE 6651EZ: Brand: IOBAN™ 2

## (undated) DEVICE — DOUBLE BASIN SET: Brand: MEDLINE INDUSTRIES, INC.

## (undated) DEVICE — HEMOSPRAY ENDOSCOPIC HEMOSTAT: Brand: HEMOSPRAY

## (undated) DEVICE — GOWN,SIRUS,FABRNF,2XL,18/CS: Brand: MEDLINE

## (undated) DEVICE — COVER HNDL LT DISP

## (undated) DEVICE — PACK PROCEDURE SURG GEN CUST

## (undated) DEVICE — E-Z CLEAN, NON-STICK, PTFE COATED, ELECTROSURGICAL BLADE ELECTRODE, MODIFIED EXTENDED INSULATION, 2.5 INCH (6.35 CM): Brand: MEGADYNE

## (undated) DEVICE — NEEDLE SCLERO L200CM OD0.51MM ID0.24MM SHTH DIA1.8MM LOK

## (undated) DEVICE — 3M™ STERI-DRAPE™ U-DRAPE 1015: Brand: STERI-DRAPE™

## (undated) DEVICE — NEEDLE HYPO 22GA L1.5IN BLK POLYPR HUB S STL REG BVL STR

## (undated) DEVICE — SUTURE STRATAFIX SYMMETRIC PDS + SZ 1 L18IN ABSRB VLT OS-6 SXPP1A201

## (undated) DEVICE — KIT SURG W7XL11IN 2 PKT UNTREATED NA

## (undated) DEVICE — MARKER RAD 3.5MM HEX CKPT STEREOTAXIC IMAG LESION LOC FOR

## (undated) DEVICE — ELECTRODE PT RET AD L9FT HI MOIST COND ADH HYDRGEL CORDED

## (undated) DEVICE — GAUZE,SPONGE,4"X4",8PLY,STRL,LF,10/TRAY: Brand: MEDLINE

## (undated) DEVICE — SPONGE LAP W18XL18IN WHT COT 4 PLY FLD STRUNG RADPQ DISP ST

## (undated) DEVICE — SPONGE GZ W4XL4IN RAYON POLY FILL CVR W/ NONWOVEN FAB

## (undated) DEVICE — GOWN,SIRUS,FABRNF,XL,20/CS: Brand: MEDLINE

## (undated) DEVICE — TUBING, SUCTION, 9/32" X 10', STRAIGHT: Brand: MEDLINE

## (undated) DEVICE — TOWEL,OR,DSP,ST,BLUE,STD,6/PK,12PK/CS: Brand: MEDLINE

## (undated) DEVICE — DRAPE,REIN 53X77,STERILE: Brand: MEDLINE

## (undated) DEVICE — GLOVE SURG SZ 6 THK91MIL LTX FREE SYN POLYISOPRENE ANTI

## (undated) DEVICE — 4-PORT MANIFOLD: Brand: NEPTUNE 2

## (undated) DEVICE — KIT TRK HIP PROC VIZADISC

## (undated) DEVICE — SYRINGE MED 30ML STD CLR PLAS LUERLOCK TIP N CTRL DISP

## (undated) DEVICE — SUTURE ABSORBABLE MONOFILAMENT 3-0 CT1 18 IN UD MONOCRYL + SXMP1B429

## (undated) DEVICE — 2108 SERIES SAGITTAL BLADE, OFFSET (20.0 X 0.89 X 80.0MM)

## (undated) DEVICE — GLOVE SURG SZ 65 L12IN FNGR THK79MIL GRN LTX FREE

## (undated) DEVICE — SKIN AFFIX SURG ADHESIVE 72/CS 0.55ML: Brand: MEDLINE

## (undated) DEVICE — BLADE CLIPPER GEN PURP NS

## (undated) DEVICE — WORKING LENGTH 155CM, WORKING CHANNEL 2.8MM: Brand: RESOLUTION 360 CLIP

## (undated) DEVICE — KIT SURG PREP POVIDONE IOD PRESATURATED PAINT WET FOR UNIV

## (undated) DEVICE — GLOVE SURG SZ 8 CRM LTX FREE POLYISOPRENE POLYMER BEAD ANTI

## (undated) DEVICE — STANDARD HYPODERMIC NEEDLE,ALUMINUM HUB: Brand: MONOJECT

## (undated) DEVICE — TOTAL HIP PK

## (undated) DEVICE — STRIP,CLOSURE,WOUND,MEDI-STRIP,1/2X4: Brand: MEDLINE

## (undated) DEVICE — TUBE IRRIG HNDPC HI FLO TP INTRPULS W/SUCTION TUBE

## (undated) DEVICE — GLOVE SURG SZ 65 THK91MIL LTX FREE SYN POLYISOPRENE

## (undated) DEVICE — TRAP SPEC MUCUS FOR SUCT

## (undated) DEVICE — SOLUTION IV IRRIG POUR BRL 0.9% SODIUM CHL 2F7124

## (undated) DEVICE — SOLUTION IRRIG 3000ML 0.9% SOD CHL USP UROMATIC PLAS CONT

## (undated) DEVICE — K-WIRE
Type: IMPLANTABLE DEVICE | Site: HIP | Status: NON-FUNCTIONAL
Brand: PRO
Removed: 2022-10-26

## (undated) DEVICE — BLADE ES L6IN ELASTOMERIC COAT EXT DURABLE BEND UPTO 90DEG

## (undated) DEVICE — DRAPE,TOP,102X53,STERILE: Brand: MEDLINE

## (undated) DEVICE — ADHESIVE SKIN CLSR 0.7ML TOP DERMBND ADV

## (undated) DEVICE — PILLOW POS W15XH6XL22IN RASPBERRY FOAM ABD W/ STRP DISP FOR

## (undated) DEVICE — 1000 S-DRAPE TOWEL DRAPE 10/BX: Brand: STERI-DRAPE™

## (undated) DEVICE — PATIENT RETURN ELECTRODE, SINGLE-USE, CONTACT QUALITY MONITORING, ADULT, WITH 9FT CORD, FOR PATIENTS WEIGING OVER 33LBS. (15KG): Brand: MEGADYNE

## (undated) DEVICE — PEEL-AWAY HOOD: Brand: FLYTE, SURGICOOL

## (undated) DEVICE — GRADUATE TRIANG MEASURE 1000ML BLK PRNT

## (undated) DEVICE — SHEET, T, LAPAROTOMY, STERILE: Brand: MEDLINE

## (undated) DEVICE — GLOVE ORTHO 8   MSG9480

## (undated) DEVICE — BLOCK BITE 60FR RUBBER ADLT DENTAL

## (undated) DEVICE — PIN BNE FIX TEMP L140MM DIA4MM MAKO

## (undated) DEVICE — BIPOLAR ELECTROHEMOSTASIS CATHETER: Brand: GOLD PROBE

## (undated) DEVICE — TAPE ADH W3INXL10YD WHT COT WVN BK POWERFUL RUB BASE HIGHLY

## (undated) DEVICE — 3M™ TEGADERM™ TRANSPARENT FILM DRESSING FRAME STYLE, 1626W, 4 IN X 4-3/4 IN (10 CM X 12 CM), 50/CT 4CT/CASE: Brand: 3M™ TEGADERM™